# Patient Record
Sex: FEMALE | Race: WHITE | Employment: FULL TIME | URBAN - METROPOLITAN AREA
[De-identification: names, ages, dates, MRNs, and addresses within clinical notes are randomized per-mention and may not be internally consistent; named-entity substitution may affect disease eponyms.]

---

## 2017-06-12 ENCOUNTER — HOSPITAL ENCOUNTER (OUTPATIENT)
Dept: MRI IMAGING | Age: 67
Discharge: HOME | End: 2017-06-12
Payer: COMMERCIAL

## 2017-06-12 ENCOUNTER — HOSPITAL ENCOUNTER (OUTPATIENT)
Age: 67
Discharge: HOME | End: 2017-06-12
Payer: COMMERCIAL

## 2017-06-12 ENCOUNTER — HOSPITAL ENCOUNTER (OUTPATIENT)
Dept: GENERAL RADIOLOGY | Age: 67
Discharge: HOME | End: 2017-06-12
Payer: COMMERCIAL

## 2017-06-12 DIAGNOSIS — M79.672 PAIN IN LEFT FOOT: ICD-10-CM

## 2017-06-12 DIAGNOSIS — M51.36 OTHER INTERVERTEBRAL DISC DEGENERATION, LUMBAR REGION: ICD-10-CM

## 2017-06-12 PROCEDURE — 72148 MRI LUMBAR SPINE W/O DYE: CPT

## 2017-06-12 PROCEDURE — 73620 X-RAY EXAM OF FOOT: CPT

## 2018-06-15 ENCOUNTER — HOSPITAL ENCOUNTER (OUTPATIENT)
Dept: WOMENS IMAGING | Age: 68
Discharge: HOME | End: 2018-06-17
Payer: COMMERCIAL

## 2018-06-15 DIAGNOSIS — Z12.39 SCREENING BREAST EXAMINATION: ICD-10-CM

## 2018-06-15 DIAGNOSIS — Z78.0 POST-MENOPAUSAL: ICD-10-CM

## 2018-06-15 PROCEDURE — 77080 DXA BONE DENSITY AXIAL: CPT

## 2018-06-15 PROCEDURE — 77067 SCR MAMMO BI INCL CAD: CPT

## 2020-01-20 ENCOUNTER — HOSPITAL ENCOUNTER (OUTPATIENT)
Dept: GENERAL RADIOLOGY | Age: 70
Discharge: HOME | End: 2020-01-22
Payer: COMMERCIAL

## 2020-01-20 ENCOUNTER — HOSPITAL ENCOUNTER (OUTPATIENT)
Age: 70
Discharge: HOME | End: 2020-01-22
Payer: COMMERCIAL

## 2020-01-20 PROCEDURE — 72100 X-RAY EXAM L-S SPINE 2/3 VWS: CPT

## 2020-01-20 PROCEDURE — 73502 X-RAY EXAM HIP UNI 2-3 VIEWS: CPT

## 2020-08-22 PROBLEM — R00.2 PALPITATIONS: Status: ACTIVE | Noted: 2020-08-22

## 2020-08-22 PROBLEM — R06.09 DYSPNEA ON EXERTION: Status: ACTIVE | Noted: 2020-08-22

## 2020-08-22 PROBLEM — R60.0 BILATERAL LEG EDEMA: Status: ACTIVE | Noted: 2020-08-22

## 2020-11-21 ENCOUNTER — APPOINTMENT (OUTPATIENT)
Dept: CT IMAGING | Age: 70
End: 2020-11-21
Payer: COMMERCIAL

## 2020-11-21 ENCOUNTER — APPOINTMENT (OUTPATIENT)
Dept: ULTRASOUND IMAGING | Age: 70
End: 2020-11-21
Payer: COMMERCIAL

## 2020-11-21 ENCOUNTER — HOSPITAL ENCOUNTER (EMERGENCY)
Age: 70
Discharge: HOME | End: 2020-11-21
Attending: SPECIALIST
Payer: COMMERCIAL

## 2020-11-21 ENCOUNTER — APPOINTMENT (OUTPATIENT)
Dept: GENERAL RADIOLOGY | Age: 70
End: 2020-11-21
Payer: COMMERCIAL

## 2020-11-21 VITALS
OXYGEN SATURATION: 98 % | HEART RATE: 58 BPM | DIASTOLIC BLOOD PRESSURE: 80 MMHG | TEMPERATURE: 97.5 F | HEIGHT: 63 IN | BODY MASS INDEX: 31.89 KG/M2 | SYSTOLIC BLOOD PRESSURE: 106 MMHG | WEIGHT: 180 LBS | RESPIRATION RATE: 18 BRPM

## 2020-11-21 LAB
-: NORMAL
ABSOLUTE EOS #: 0.1 K/UL (ref 0–0.4)
ABSOLUTE IMMATURE GRANULOCYTE: NORMAL K/UL (ref 0–0.3)
ABSOLUTE LYMPH #: 1.6 K/UL (ref 1–4.8)
ABSOLUTE MONO #: 0.4 K/UL (ref 0.1–1.2)
ALBUMIN SERPL-MCNC: 4.3 G/DL (ref 3.5–5.2)
ALBUMIN/GLOBULIN RATIO: 1.7 (ref 1–2.5)
ALP BLD-CCNC: 57 U/L (ref 35–104)
ALT SERPL-CCNC: 30 U/L (ref 5–33)
ANION GAP SERPL CALCULATED.3IONS-SCNC: 9 MMOL/L (ref 9–17)
AST SERPL-CCNC: 24 U/L
BASOPHILS # BLD: 1 % (ref 0–2)
BASOPHILS ABSOLUTE: 0.1 K/UL (ref 0–0.2)
BILIRUB SERPL-MCNC: 0.33 MG/DL (ref 0.3–1.2)
BILIRUBIN DIRECT: 0.13 MG/DL
BILIRUBIN, INDIRECT: 0.2 MG/DL (ref 0–1)
BNP INTERPRETATION: NORMAL
BUN BLDV-MCNC: 23 MG/DL (ref 8–23)
BUN/CREAT BLD: ABNORMAL (ref 9–20)
CALCIUM SERPL-MCNC: 9.3 MG/DL (ref 8.6–10.4)
CHLORIDE BLD-SCNC: 106 MMOL/L (ref 98–107)
CO2: 25 MMOL/L (ref 20–31)
CREAT SERPL-MCNC: 0.89 MG/DL (ref 0.5–0.9)
D-DIMER QUANTITATIVE: 0.9 MG/L FEU
DIFFERENTIAL TYPE: NORMAL
EOSINOPHILS RELATIVE PERCENT: 3 % (ref 1–4)
GFR AFRICAN AMERICAN: >60 ML/MIN
GFR NON-AFRICAN AMERICAN: >60 ML/MIN
GFR SERPL CREATININE-BSD FRML MDRD: ABNORMAL ML/MIN/{1.73_M2}
GFR SERPL CREATININE-BSD FRML MDRD: ABNORMAL ML/MIN/{1.73_M2}
GLOBULIN: NORMAL G/DL (ref 1.5–3.8)
GLUCOSE BLD-MCNC: 117 MG/DL (ref 70–99)
HCT VFR BLD CALC: 38.2 % (ref 36–46)
HEMOGLOBIN: 12.7 G/DL (ref 12–16)
HOMOCYSTEINE: 12.9 UMOL/L
IMMATURE GRANULOCYTES: NORMAL %
LYMPHOCYTES # BLD: 30 % (ref 24–44)
MAGNESIUM: 2 MG/DL (ref 1.6–2.6)
MCH RBC QN AUTO: 31.3 PG (ref 26–34)
MCHC RBC AUTO-ENTMCNC: 33.3 G/DL (ref 31–37)
MCV RBC AUTO: 94 FL (ref 80–100)
MONOCYTES # BLD: 7 % (ref 2–11)
NRBC AUTOMATED: NORMAL PER 100 WBC
PDW BLD-RTO: 13 % (ref 12.5–15.4)
PLATELET # BLD: 252 K/UL (ref 140–450)
PLATELET ESTIMATE: NORMAL
PMV BLD AUTO: 9.6 FL (ref 6–12)
POTASSIUM SERPL-SCNC: 3.9 MMOL/L (ref 3.7–5.3)
PRO-BNP: 41 PG/ML
RBC # BLD: 4.07 M/UL (ref 4–5.2)
RBC # BLD: NORMAL 10*6/UL
REASON FOR REJECTION: NORMAL
SARS-COV-2, RAPID: NOT DETECTED
SARS-COV-2: NORMAL
SARS-COV-2: NORMAL
SEG NEUTROPHILS: 59 % (ref 36–66)
SEGMENTED NEUTROPHILS ABSOLUTE COUNT: 3.2 K/UL (ref 1.8–7.7)
SODIUM BLD-SCNC: 140 MMOL/L (ref 135–144)
SOURCE: NORMAL
TOTAL PROTEIN: 6.9 G/DL (ref 6.4–8.3)
TROPONIN INTERP: NORMAL
TROPONIN T: NORMAL NG/ML
TROPONIN, HIGH SENSITIVITY: 6 NG/L (ref 0–14)
WBC # BLD: 5.4 K/UL (ref 3.5–11)
WBC # BLD: NORMAL 10*3/UL
ZZ NTE CLEAN UP: ORDERED TEST: NORMAL
ZZ NTE WITH NAME CLEAN UP: SPECIMEN SOURCE: NORMAL

## 2020-11-21 PROCEDURE — 83880 ASSAY OF NATRIURETIC PEPTIDE: CPT

## 2020-11-21 PROCEDURE — 71260 CT THORAX DX C+: CPT

## 2020-11-21 PROCEDURE — 86038 ANTINUCLEAR ANTIBODIES: CPT

## 2020-11-21 PROCEDURE — 93005 ELECTROCARDIOGRAM TRACING: CPT | Performed by: EMERGENCY MEDICINE

## 2020-11-21 PROCEDURE — 86147 CARDIOLIPIN ANTIBODY EA IG: CPT

## 2020-11-21 PROCEDURE — 85300 ANTITHROMBIN III ACTIVITY: CPT

## 2020-11-21 PROCEDURE — 85613 RUSSELL VIPER VENOM DILUTED: CPT

## 2020-11-21 PROCEDURE — 80076 HEPATIC FUNCTION PANEL: CPT

## 2020-11-21 PROCEDURE — U0002 COVID-19 LAB TEST NON-CDC: HCPCS

## 2020-11-21 PROCEDURE — 71045 X-RAY EXAM CHEST 1 VIEW: CPT

## 2020-11-21 PROCEDURE — 81291 MTHFR GENE: CPT

## 2020-11-21 PROCEDURE — 84484 ASSAY OF TROPONIN QUANT: CPT

## 2020-11-21 PROCEDURE — 2580000003 HC RX 258: Performed by: EMERGENCY MEDICINE

## 2020-11-21 PROCEDURE — 96361 HYDRATE IV INFUSION ADD-ON: CPT

## 2020-11-21 PROCEDURE — 93971 EXTREMITY STUDY: CPT

## 2020-11-21 PROCEDURE — 99285 EMERGENCY DEPT VISIT HI MDM: CPT

## 2020-11-21 PROCEDURE — 83735 ASSAY OF MAGNESIUM: CPT

## 2020-11-21 PROCEDURE — 85306 CLOT INHIBIT PROT S FREE: CPT

## 2020-11-21 PROCEDURE — 85379 FIBRIN DEGRADATION QUANT: CPT

## 2020-11-21 PROCEDURE — 96360 HYDRATION IV INFUSION INIT: CPT

## 2020-11-21 PROCEDURE — 85303 CLOT INHIBIT PROT C ACTIVITY: CPT

## 2020-11-21 PROCEDURE — 80048 BASIC METABOLIC PNL TOTAL CA: CPT

## 2020-11-21 PROCEDURE — 36415 COLL VENOUS BLD VENIPUNCTURE: CPT

## 2020-11-21 PROCEDURE — 6360000004 HC RX CONTRAST MEDICATION: Performed by: EMERGENCY MEDICINE

## 2020-11-21 PROCEDURE — 83090 ASSAY OF HOMOCYSTEINE: CPT

## 2020-11-21 PROCEDURE — 85730 THROMBOPLASTIN TIME PARTIAL: CPT

## 2020-11-21 PROCEDURE — 85610 PROTHROMBIN TIME: CPT

## 2020-11-21 PROCEDURE — 85025 COMPLETE CBC W/AUTO DIFF WBC: CPT

## 2020-11-21 PROCEDURE — 81241 F5 GENE: CPT

## 2020-11-21 PROCEDURE — 81240 F2 GENE: CPT

## 2020-11-21 RX ORDER — SODIUM CHLORIDE 9 MG/ML
1000 INJECTION, SOLUTION INTRAVENOUS CONTINUOUS
Status: DISCONTINUED | OUTPATIENT
Start: 2020-11-21 | End: 2020-11-21 | Stop reason: HOSPADM

## 2020-11-21 RX ORDER — SODIUM CHLORIDE 0.9 % (FLUSH) 0.9 %
10 SYRINGE (ML) INJECTION PRN
Status: DISCONTINUED | OUTPATIENT
Start: 2020-11-21 | End: 2020-11-21

## 2020-11-21 RX ORDER — SODIUM CHLORIDE 0.9 % (FLUSH) 0.9 %
10 SYRINGE (ML) INJECTION PRN
Status: COMPLETED | OUTPATIENT
Start: 2020-11-21 | End: 2020-11-21

## 2020-11-21 RX ORDER — 0.9 % SODIUM CHLORIDE 0.9 %
80 INTRAVENOUS SOLUTION INTRAVENOUS ONCE
Status: COMPLETED | OUTPATIENT
Start: 2020-11-21 | End: 2020-11-21

## 2020-11-21 RX ADMIN — SODIUM CHLORIDE 1000 ML: 9 INJECTION, SOLUTION INTRAVENOUS at 08:21

## 2020-11-21 RX ADMIN — SODIUM CHLORIDE 80 ML: 9 INJECTION, SOLUTION INTRAVENOUS at 09:03

## 2020-11-21 RX ADMIN — IOPAMIDOL 75 ML: 755 INJECTION, SOLUTION INTRAVENOUS at 09:03

## 2020-11-21 RX ADMIN — Medication 10 ML: at 09:02

## 2020-11-21 ASSESSMENT — ENCOUNTER SYMPTOMS
COUGH: 0
SHORTNESS OF BREATH: 0

## 2020-11-21 NOTE — ED NOTES
ED physician requests to speak with pts cardiologist, dr. Jayme Love. Spoke with after hours staff who reports that Dr. Curt Sepulveda in on call and will be paged.   Will await call back        Letha Segovia RN  11/21/20 9690

## 2020-11-21 NOTE — ED NOTES
Pt ambulated to restroom, denies needs. Will continue to monitor.       Khoa Barkley RN  11/21/20 1778

## 2020-11-21 NOTE — ED NOTES
Pt and spouse are updated on plan of care, they have no requests or complaints at this time.  Call light within reach     Anjum Ca RN  11/21/20 5987

## 2020-11-21 NOTE — ED PROVIDER NOTES
by mouth daily    CLONIDINE (CATAPRES) 0.3 MG TABLET    Take 0.3 mg by mouth daily     ELASTIC BANDAGES & SUPPORTS (JOBST KNEE HIGH COMPRESSION SM) MISC    1 each by Does not apply route daily    FENOFIBRATE (TRIGLIDE) 160 MG TABLET    Take 160 mg by mouth daily    GABAPENTIN (NEURONTIN) 300 MG CAPSULE    Take 300 mg by mouth 4 times daily.  2 caps bid and 1 cap q afternoon    HYDROCHLOROTHIAZIDE (HYDRODIURIL) 25 MG TABLET    Take 1 tablet by mouth every morning May take 1 additional tablet daily PRN swelling    PANTOPRAZOLE (PROTONIX) 40 MG TABLET    Take 40 mg by mouth daily    RAMIPRIL (ALTACE) 10 MG CAPSULE    Take 1 capsule by mouth daily       ALLERGIES     Sulfa antibiotics and Flagyl [metronidazole]    FAMILY HISTORY       Family History   Problem Relation Age of Onset    Breast Cancer Mother     Heart Attack Father           SOCIAL HISTORY       Social History     Socioeconomic History    Marital status:      Spouse name: None    Number of children: None    Years of education: None    Highest education level: None   Occupational History    None   Social Needs    Financial resource strain: None    Food insecurity     Worry: None     Inability: None    Transportation needs     Medical: None     Non-medical: None   Tobacco Use    Smoking status: Never Smoker    Smokeless tobacco: Never Used   Substance and Sexual Activity    Alcohol use: No    Drug use: No    Sexual activity: Not Currently   Lifestyle    Physical activity     Days per week: None     Minutes per session: None    Stress: None   Relationships    Social connections     Talks on phone: None     Gets together: None     Attends Sikh service: None     Active member of club or organization: None     Attends meetings of clubs or organizations: None     Relationship status: None    Intimate partner violence     Fear of current or ex partner: None     Emotionally abused: None     Physically abused: None     Forced sexual activity: None   Other Topics Concern    None   Social History Narrative    None       SCREENINGS    Elaine Coma Scale  Eye Opening: Spontaneous  Best Verbal Response: Oriented  Best Motor Response: Obeys commands  Elaine Coma Scale Score: 15        PHYSICAL EXAM    (up to 7 for level 4, 8 or more for level 5)     ED Triage Vitals [11/21/20 0651]   BP Temp Temp Source Pulse Resp SpO2 Height Weight   130/82 97.5 °F (36.4 °C) Oral 70 18 100 % 5' 3\" (1.6 m) 180 lb (81.6 kg)       Physical Exam  Vitals signs reviewed. Constitutional:       General: She is not in acute distress. Appearance: She is not ill-appearing. HENT:      Head: Normocephalic. Right Ear: External ear normal.      Left Ear: External ear normal.      Nose: Nose normal.   Eyes:      Extraocular Movements: Extraocular movements intact. Neck:      Musculoskeletal: Neck supple. Cardiovascular:      Rate and Rhythm: Normal rate and regular rhythm. Pulmonary:      Effort: Pulmonary effort is normal.      Breath sounds: Normal breath sounds. Abdominal:      General: There is no distension. Palpations: Abdomen is soft. Musculoskeletal: Normal range of motion. General: No tenderness. Right lower leg: Edema present. Left lower leg: Edema present. Skin:     General: Skin is warm and dry. Coloration: Skin is not pale. Neurological:      General: No focal deficit present. Mental Status: She is alert and oriented to person, place, and time. Psychiatric:         Mood and Affect: Mood normal.         DIAGNOSTIC RESULTS     EKG: All EKG's are interpreted by the Emergency Department Physician who either signs orCo-signs this chart in the absence of a cardiologist.    Sinus rhythm with rate of 65 bpm.  First-degree AV block. Normal axis. No acute ST elevation.     RADIOLOGY:   Non-plain film images such as CT, Ultrasound and MRI are read by the radiologist. Plain radiographic images are visualized and preliminarily interpreted by the emergency physician with the below findings:    Interpretation per the Radiologist below, ifavailable at the time of this note:    US DUP LOWER EXTREMITY LEFT OTONIEL   Final Result   No evidence of DVT in the bilateral lower extremities. US DUP LOWER EXTREMITY RIGHT OTONIEL   Final Result   No evidence of DVT in the bilateral lower extremities. CT CHEST PULMONARY EMBOLISM W CONTRAST   Final Result   1. Segmental/subsegmental pulmonary emboli involving the right lower lobe. 2. Moderate size hiatal hernia. RECOMMENDATIONS:   Findings were discussed with KATIE RAPHAEL at 9:44 am on 11/21/2020. XR CHEST PORTABLE   Final Result   No acute process. ED BEDSIDE ULTRASOUND:   Performed by ED Physician - none    LABS:  Labs Reviewed   BASIC METABOLIC PANEL - Abnormal; Notable for the following components:       Result Value    Glucose 117 (*)     All other components within normal limits   LUPUS ANTICOAGULANT - Abnormal; Notable for the following components:    PTT 21.2 (*)     All other components within normal limits   HEPATIC FUNCTION PANEL   TROPONIN   BRAIN NATRIURETIC PEPTIDE   D-DIMER, QUANTITATIVE   MAGNESIUM   CBC WITH AUTO DIFFERENTIAL   COVID-19   SPECIMEN REJECTION   LUPUS ANTICOAGULANT   GEORGE SCREEN WITH REFLEX   FACTOR 5 LEIDEN   HOMOCYSTEINE, SERUM   MTHFR MUTATION   PROTHROMBIN GENE MUTATION   ANTITHROMBIN III ACTIVITY   PROTEIN C FUNCTIONAL   PROTEIN S FUNCTIONAL   PREVIOUS SPECIMEN       All other labs were within normal range ornot returned as of this dictation. EMERGENCY DEPARTMENT COURSE and DIFFERENTIAL DIAGNOSIS/MDM:   Vitals:    Vitals:    11/21/20 0715 11/21/20 0730 11/21/20 0938 11/21/20 1153   BP: 108/72 101/67 (!) 109/55 118/78   Pulse: 59 57 63 57   Resp:    16   Temp:       TempSrc:       SpO2: 98% 98% 98% 99%   Weight:       Height:           ED Course as of Nov 21 1322   Sat Nov 21, 2020   1316 Cardiac work-up is negative. Intravenous New Bag 11/21/20 0821)   iopamidol (ISOVUE-370) 76 % injection 75 mL (75 mLs Intravenous Given 11/21/20 0903)   0.9 % sodium chloride bolus (0 mLs Intravenous Stopped 11/21/20 1257)   sodium chloride flush 0.9 % injection 10 mL (10 mLs Intravenous Given 11/21/20 0902)       New Prescriptions from this visit:    New Prescriptions    RIVAROXABAN (XARELTO) 15 MG TABS TABLET    Take 1 tablet by mouth daily (with breakfast)       Follow-up:  Cheryl Ahumada, DO  [facility].  CATALINA Espinosa  [employer] [address], [address] [place] [address] Vladislav Soriano [address] [place] [address] 171 120              Final Impression:   1.  Pulmonary thromboembolism ([address]               (Please note that portions of this note were completed with a voice recognitionprogram.  Efforts were made to edit the dictations but occasionally words are mis-transcribed.)    Shawn Lo MD (electronically signed)  Attending Emergency Physician            Shawn Lo MD  11/21/20 [phone]

## 2020-11-21 NOTE — ED NOTES
Luana Sterling MD at bedside. Results discussed with pt et spouse.         Jorge Morris RN  11/21/20 0201

## 2020-11-23 ENCOUNTER — HOSPITAL ENCOUNTER (EMERGENCY)
Age: 70
Discharge: HOME | End: 2020-11-23
Attending: EMERGENCY MEDICINE
Payer: COMMERCIAL

## 2020-11-23 VITALS
BODY MASS INDEX: 31.89 KG/M2 | TEMPERATURE: 98.6 F | OXYGEN SATURATION: 98 % | RESPIRATION RATE: 14 BRPM | HEIGHT: 63 IN | HEART RATE: 86 BPM | SYSTOLIC BLOOD PRESSURE: 149 MMHG | WEIGHT: 180 LBS | DIASTOLIC BLOOD PRESSURE: 87 MMHG

## 2020-11-23 LAB
ABSOLUTE EOS #: 0.1 K/UL (ref 0–0.4)
ABSOLUTE IMMATURE GRANULOCYTE: ABNORMAL K/UL (ref 0–0.3)
ABSOLUTE LYMPH #: 1 K/UL (ref 1–4.8)
ABSOLUTE MONO #: 0.3 K/UL (ref 0.1–1.2)
ANION GAP SERPL CALCULATED.3IONS-SCNC: 10 MMOL/L (ref 9–17)
ANTI-NUCLEAR ANTIBODY (ANA): NEGATIVE
AT-III ACTIVITY: 77 % (ref 83–122)
BASOPHILS # BLD: 1 % (ref 0–2)
BASOPHILS ABSOLUTE: 0 K/UL (ref 0–0.2)
BUN BLDV-MCNC: 17 MG/DL (ref 8–23)
BUN/CREAT BLD: ABNORMAL (ref 9–20)
CALCIUM SERPL-MCNC: 9.5 MG/DL (ref 8.6–10.4)
CHLORIDE BLD-SCNC: 102 MMOL/L (ref 98–107)
CO2: 24 MMOL/L (ref 20–31)
CREAT SERPL-MCNC: 0.87 MG/DL (ref 0.5–0.9)
DIFFERENTIAL TYPE: ABNORMAL
EKG ATRIAL RATE: 65 BPM
EKG P AXIS: 14 DEGREES
EKG P-R INTERVAL: 232 MS
EKG Q-T INTERVAL: 398 MS
EKG QRS DURATION: 82 MS
EKG QTC CALCULATION (BAZETT): 413 MS
EKG R AXIS: -2 DEGREES
EKG T AXIS: 21 DEGREES
EKG VENTRICULAR RATE: 65 BPM
EOSINOPHILS RELATIVE PERCENT: 2 % (ref 1–4)
GFR AFRICAN AMERICAN: >60 ML/MIN
GFR NON-AFRICAN AMERICAN: >60 ML/MIN
GFR SERPL CREATININE-BSD FRML MDRD: ABNORMAL ML/MIN/{1.73_M2}
GFR SERPL CREATININE-BSD FRML MDRD: ABNORMAL ML/MIN/{1.73_M2}
GLUCOSE BLD-MCNC: 105 MG/DL (ref 70–99)
HCT VFR BLD CALC: 38.6 % (ref 36–46)
HEMOGLOBIN: 13.1 G/DL (ref 12–16)
IMMATURE GRANULOCYTES: ABNORMAL %
INR BLD: 1.3
LYMPHOCYTES # BLD: 18 % (ref 24–44)
MCH RBC QN AUTO: 31.5 PG (ref 26–34)
MCHC RBC AUTO-ENTMCNC: 34 G/DL (ref 31–37)
MCV RBC AUTO: 92.7 FL (ref 80–100)
MONOCYTES # BLD: 6 % (ref 2–11)
NRBC AUTOMATED: ABNORMAL PER 100 WBC
PARTIAL THROMBOPLASTIN TIME: 38 SEC (ref 21.3–31.3)
PDW BLD-RTO: 12.8 % (ref 12.5–15.4)
PLATELET # BLD: 274 K/UL (ref 140–450)
PLATELET ESTIMATE: ABNORMAL
PMV BLD AUTO: 8.7 FL (ref 6–12)
POTASSIUM SERPL-SCNC: 3.5 MMOL/L (ref 3.7–5.3)
PROTEIN C ACTIVITY: 122 %
PROTEIN S ACTIVITY: 82 % (ref 59–130)
PROTHROMBIN TIME: 13.1 SEC (ref 9.4–12.6)
RBC # BLD: 4.16 M/UL (ref 4–5.2)
RBC # BLD: ABNORMAL 10*6/UL
SEG NEUTROPHILS: 73 % (ref 36–66)
SEGMENTED NEUTROPHILS ABSOLUTE COUNT: 4.3 K/UL (ref 1.8–7.7)
SODIUM BLD-SCNC: 136 MMOL/L (ref 135–144)
WBC # BLD: 5.8 K/UL (ref 3.5–11)
WBC # BLD: ABNORMAL 10*3/UL

## 2020-11-23 PROCEDURE — 99284 EMERGENCY DEPT VISIT MOD MDM: CPT

## 2020-11-23 PROCEDURE — 85025 COMPLETE CBC W/AUTO DIFF WBC: CPT

## 2020-11-23 PROCEDURE — 85610 PROTHROMBIN TIME: CPT

## 2020-11-23 PROCEDURE — 80048 BASIC METABOLIC PNL TOTAL CA: CPT

## 2020-11-23 PROCEDURE — 85730 THROMBOPLASTIN TIME PARTIAL: CPT

## 2020-11-23 PROCEDURE — 36415 COLL VENOUS BLD VENIPUNCTURE: CPT

## 2020-11-23 ASSESSMENT — PAIN DESCRIPTION - PAIN TYPE: TYPE: ACUTE PAIN

## 2020-11-23 ASSESSMENT — ENCOUNTER SYMPTOMS: SHORTNESS OF BREATH: 0

## 2020-11-23 ASSESSMENT — PAIN DESCRIPTION - LOCATION: LOCATION: GROIN

## 2020-11-23 ASSESSMENT — PAIN SCALES - GENERAL: PAINLEVEL_OUTOF10: 4

## 2020-11-23 NOTE — ED PROVIDER NOTES
Take 1 tablet by mouth every morning May take 1 additional tablet daily PRN swelling    PANTOPRAZOLE (PROTONIX) 40 MG TABLET    Take 40 mg by mouth daily    RAMIPRIL (ALTACE) 10 MG CAPSULE    Take 1 capsule by mouth daily       ALLERGIES     is allergic to sulfa antibiotics and flagyl [metronidazole]. FAMILY HISTORY     She indicated that the status of her mother is unknown. She indicated that the status of her father is unknown.     family history includes Breast Cancer in her mother; Heart Attack in her father. SOCIAL HISTORY      reports that she has never smoked. She has never used smokeless tobacco. She reports that she does not drink alcohol or use drugs. PHYSICAL EXAM    (up to 7 for level 4, 8 or more for level 5)   INITIAL VITALS:  height is 5' 3\" (1.6 m) and weight is 81.6 kg (180 lb). Her oral temperature is 98.6 °F (37 °C). Her blood pressure is 149/87 (abnormal) and her pulse is 86. Her respiration is 14 and oxygen saturation is 98%. Physical Exam  Vitals signs and nursing note reviewed. Constitutional:       Appearance: Normal appearance. HENT:      Head: Normocephalic and atraumatic. Eyes:      Conjunctiva/sclera: Conjunctivae normal.   Neck:      Musculoskeletal: Normal range of motion and neck supple. No neck rigidity or muscular tenderness. Cardiovascular:      Rate and Rhythm: Normal rate and regular rhythm. Pulmonary:      Effort: Pulmonary effort is normal.      Breath sounds: Normal breath sounds. Abdominal:      General: Bowel sounds are normal. There is no distension. Palpations: Abdomen is soft. There is no mass. Tenderness: There is no abdominal tenderness. There is no guarding or rebound. Musculoskeletal: Normal range of motion. General: No swelling or tenderness. Lymphadenopathy:      Cervical: No cervical adenopathy.    Skin:     Comments: Erythematous rash to the groin region with some small erythematous petechiae scattered throughout a confluent area of erythema that is in the groin no hives appreciated   Neurological:      General: No focal deficit present. Mental Status: She is alert.          DIFFERENTIAL DIAGNOSIS/ MDM:     I will check basic labs    DIAGNOSTIC RESULTS         LABS:  Results for orders placed or performed during the hospital encounter of 11/23/20   CBC Auto Differential   Result Value Ref Range    WBC 5.8 3.5 - 11.0 k/uL    RBC 4.16 4.0 - 5.2 m/uL    Hemoglobin 13.1 12.0 - 16.0 g/dL    Hematocrit 38.6 36 - 46 %    MCV 92.7 80 - 100 fL    MCH 31.5 26 - 34 pg    MCHC 34.0 31 - 37 g/dL    RDW 12.8 12.5 - 15.4 %    Platelets 560 547 - 803 k/uL    MPV 8.7 6.0 - 12.0 fL    NRBC Automated NOT REPORTED per 100 WBC    Differential Type NOT REPORTED     Seg Neutrophils 73 (H) 36 - 66 %    Lymphocytes 18 (L) 24 - 44 %    Monocytes 6 2 - 11 %    Eosinophils % 2 1 - 4 %    Basophils 1 0 - 2 %    Immature Granulocytes NOT REPORTED 0 %    Segs Absolute 4.30 1.8 - 7.7 k/uL    Absolute Lymph # 1.00 1.0 - 4.8 k/uL    Absolute Mono # 0.30 0.1 - 1.2 k/uL    Absolute Eos # 0.10 0.0 - 0.4 k/uL    Basophils Absolute 0.00 0.0 - 0.2 k/uL    Absolute Immature Granulocyte NOT REPORTED 0.00 - 0.30 k/uL    WBC Morphology NOT REPORTED     RBC Morphology NOT REPORTED     Platelet Estimate NOT REPORTED    Basic Metabolic Panel   Result Value Ref Range    Glucose 105 (H) 70 - 99 mg/dL    BUN 17 8 - 23 mg/dL    CREATININE 0.87 0.50 - 0.90 mg/dL    Bun/Cre Ratio NOT REPORTED 9 - 20    Calcium 9.5 8.6 - 10.4 mg/dL    Sodium 136 135 - 144 mmol/L    Potassium 3.5 (L) 3.7 - 5.3 mmol/L    Chloride 102 98 - 107 mmol/L    CO2 24 20 - 31 mmol/L    Anion Gap 10 9 - 17 mmol/L    GFR Non-African American >60 >60 mL/min    GFR African American >60 >60 mL/min    GFR Comment          GFR Staging NOT REPORTED    Protime-INR   Result Value Ref Range    Protime 13.1 (H) 9.4 - 12.6 sec    INR 1.3    APTT   Result Value Ref Range    PTT 38.0 (H) 21.3 - 31.3 sec EMERGENCY DEPARTMENT COURSE:   Vitals:    Vitals:    11/23/20 1038   BP: (!) 149/87   Pulse: 86   Resp: 14   Temp: 98.6 °F (37 °C)   TempSrc: Oral   SpO2: 98%   Weight: 81.6 kg (180 lb)   Height: 5' 3\" (1.6 m)     -------------------------  BP: (!) 149/87, Temp: 98.6 °F (37 °C), Pulse: 86, Resp: 14      RE-EVALUATION:  Patient presents with a dermatitis in the groin region I cannot completely rule out that this is not a reaction to the Xarelto she was started on I did discuss the patient with her family doctor she is requesting that we have her stop the Xarelto and switch her over to Eliquis and the family doctor will see her in the office    CONSULTS:  The patient's family doctor is requesting that we stop the Xarelto switch the patient over to Eliquis and she will then see the patient in her office    PROCEDURES:  None    FINAL IMPRESSION      1. Rash and other nonspecific skin eruption          DISPOSITION/PLAN   DISPOSITION        CONDITION ON DISPOSITION:   Stable    PATIENT REFERRED TO:  Kirsten Ahumada, [address]. [address] [place] [address]  [phone]    Call in 2 days        DISCHARGE MEDICATIONS:  New Prescriptions    APIXABAN (ELIQUIS) 5 MG TABS TABLET    Take 1 tablet by mouth 2 times daily       (Please note that portions of this note were completed with a voicerecognition program.  Efforts were made to edit the dictations but occasionally words are mis-transcribed.)    Beal MD, F.A.C.E.P.   Attending Emergency Medicine Physician       Rachid Sanchez MD  11/23/20 7296

## 2020-11-24 LAB
ANTICARDIOLIPIN IGA ANTIBODY: 2.3 APU
ANTICARDIOLIPIN IGG ANTIBODY: 3.2 GPU
CARDIOLIPIN AB IGM: 15.4 MPU
DILUTE RUSSELL VIPER VENOM TIME: ABNORMAL
INR BLD: 1
LUPUS ANTICOAG: ABNORMAL
PARTIAL THROMBOPLASTIN TIME: 21.2 SEC (ref 21.3–31.3)
PROTHROMBIN TIME: 10.2 SEC (ref 9.4–12.6)

## 2020-11-27 LAB
FACTOR V MUTATION: NEGATIVE
MTHFR INTERPRETATION: NORMAL
MTHFR MUTATION A1286C: NORMAL
MTHFR MUTATION C665T: NORMAL
SPECIMEN: NORMAL
SPECIMEN: NORMAL

## 2020-11-30 LAB
ANTICARDIOLIPIN IGA ANTIBODY: NORMAL APU
ANTICARDIOLIPIN IGG ANTIBODY: NORMAL GPU
CARDIOLIPIN AB IGM: NORMAL MPU
DILUTE RUSSELL VIPER VENOM TIME: NORMAL
INR BLD: NORMAL
LUPUS ANTICOAG: NORMAL
PARTIAL THROMBOPLASTIN TIME: NORMAL SEC
PROTHROMBIN G20210A MUTATION: NEGATIVE
PROTHROMBIN TIME: NORMAL SEC
PT PCR SPECIMEN: NORMAL

## 2021-02-24 ENCOUNTER — HOSPITAL ENCOUNTER (OUTPATIENT)
Age: 71
Discharge: HOME | End: 2021-02-26
Payer: COMMERCIAL

## 2021-02-24 ENCOUNTER — HOSPITAL ENCOUNTER (OUTPATIENT)
Dept: GENERAL RADIOLOGY | Age: 71
Discharge: HOME | End: 2021-02-26
Payer: COMMERCIAL

## 2021-02-24 DIAGNOSIS — M25.551 RIGHT HIP PAIN: ICD-10-CM

## 2021-02-24 DIAGNOSIS — M51.36 OTHER INTERVERTEBRAL DISC DEGENERATION, LUMBAR REGION: ICD-10-CM

## 2021-02-24 PROCEDURE — 72100 X-RAY EXAM L-S SPINE 2/3 VWS: CPT

## 2021-02-24 PROCEDURE — 73502 X-RAY EXAM HIP UNI 2-3 VIEWS: CPT

## 2021-04-10 ENCOUNTER — HOSPITAL ENCOUNTER (OUTPATIENT)
Age: 71
Discharge: HOME | End: 2021-04-10
Payer: COMMERCIAL

## 2021-04-10 PROCEDURE — 36415 COLL VENOUS BLD VENIPUNCTURE: CPT

## 2021-04-10 PROCEDURE — 85300 ANTITHROMBIN III ACTIVITY: CPT

## 2021-04-10 PROCEDURE — 85303 CLOT INHIBIT PROT C ACTIVITY: CPT

## 2021-04-10 PROCEDURE — 85306 CLOT INHIBIT PROT S FREE: CPT

## 2021-04-10 PROCEDURE — 86147 CARDIOLIPIN ANTIBODY EA IG: CPT

## 2021-04-13 LAB
ANTICARDIOLIPIN IGA ANTIBODY: 6.7 APL (ref 0–14)
ANTICARDIOLIPIN IGG ANTIBODY: 1.8 GPL (ref 0–10)
AT-III ACTIVITY: 96 % (ref 83–122)
CARDIOLIPIN AB IGM: 1.9 MPL (ref 0–10)
PROTEIN C ACTIVITY: >149 %
PROTEIN S ACTIVITY: 130 % (ref 59–130)

## 2023-06-21 ENCOUNTER — HOSPITAL ENCOUNTER (OUTPATIENT)
Age: 73
Discharge: HOME | End: 2023-06-23
Payer: MEDICARE

## 2023-06-21 ENCOUNTER — HOSPITAL ENCOUNTER (OUTPATIENT)
Dept: GENERAL RADIOLOGY | Age: 73
Discharge: HOME | End: 2023-06-23
Payer: MEDICARE

## 2023-06-21 DIAGNOSIS — M79.642 PAIN OF LEFT HAND: ICD-10-CM

## 2023-06-21 DIAGNOSIS — M54.2 CERVICALGIA: ICD-10-CM

## 2023-06-21 DIAGNOSIS — M48.062 LUMBAR STENOSIS WITH NEUROGENIC CLAUDICATION: ICD-10-CM

## 2023-06-21 DIAGNOSIS — M79.644 PAIN IN RIGHT FINGER(S): ICD-10-CM

## 2023-06-21 PROCEDURE — 72100 X-RAY EXAM L-S SPINE 2/3 VWS: CPT

## 2023-06-21 PROCEDURE — 73140 X-RAY EXAM OF FINGER(S): CPT

## 2023-06-21 PROCEDURE — 73120 X-RAY EXAM OF HAND: CPT

## 2023-06-21 PROCEDURE — 72040 X-RAY EXAM NECK SPINE 2-3 VW: CPT

## 2023-08-31 ENCOUNTER — HOSPITAL ENCOUNTER (OUTPATIENT)
Dept: PHYSICAL THERAPY | Facility: CLINIC | Age: 73
Setting detail: THERAPIES SERIES
Discharge: HOME | End: 2023-08-31
Payer: MEDICARE

## 2023-08-31 PROCEDURE — 97161 PT EVAL LOW COMPLEX 20 MIN: CPT

## 2023-08-31 PROCEDURE — 97110 THERAPEUTIC EXERCISES: CPT

## 2023-08-31 NOTE — CONSULTS
[address]  Phone: [phone]  Fax: [phone]       Physical Therapy Spine Evaluation    Date:  2023  Patient: Benjamin Cruz  : 1950  MRN: [id number]  Physician: Stephanie Gillette DO   Insurance: [employer] ([employer]  Medical Diagnosis: M47.816 (ICD-10-CM) - Spondylosis without myelopathy or radiculopathy, lumbar region  M51.36 (ICD-10-CM) - Other intervertebral disc degeneration, lumbar region  Rehab Codes: M54.5  Onset Date: 23  Next 's appt. : --      Subjective:   CC/HPI: Pt reports to PT with low back pain. Per pt, she reports that she has been having pain in her R LB that has gotten worse over the last 4 months while helping her elderly neighbor. States that her pain limits her ability to walk, as well as occasionally have pain with standing for extended periods or going to sit down. Pt notes that she has taken prednisone which helped and is now taking a muscle relaxer however it's too early to tell if it is helping. Pt denies any numbness/tingling down her LEs. PMHx:   [] Unremarkable               [x] Refer to full medical chart  In [facility]         Radiology: Date Performed: Results:     [x] X-RAY 23 FINDINGS:  Levoscoliosis is noted. Vertebral bodies are preserved in height. No  fracture or bone erosion identified. There is severe degenerative disc space  narrowing at L1-2 and L4-5, mild disc space narrowing at L2-3 and moderate  disc space narrowing at L3-4. Endplate spurring is noted at all levels from  degenerative spondylosis. IMPRESSION:  Levoscoliosis with multilevel degenerative change.    [] MRI     [] Other            Comorbidities:   [] Obesity [] Dialysis  [] N/A   [] Asthma/COPD [] Dementia [x] Other: Cancer   [] Stroke [] Sleep apnea [x] Other: Lung disease   [] Vascular disease [] Rheumatic disease [] Other:       Fall Risk:      [x] None   [] PRESENT/See Whitaker Scale

## 2023-09-06 ENCOUNTER — HOSPITAL ENCOUNTER (OUTPATIENT)
Dept: PHYSICAL THERAPY | Facility: CLINIC | Age: 73
Setting detail: THERAPIES SERIES
Discharge: HOME | End: 2023-09-06
Payer: MEDICARE

## 2023-09-06 PROCEDURE — 97110 THERAPEUTIC EXERCISES: CPT

## 2023-09-06 NOTE — FLOWSHEET NOTE
[] [address]  [address] [facility].  [phone]  F: [phone] [] [address] [facility]  [address]   [address]  P: [phone]  F: [phone] [x] [address]  [facility]  P: [phone]  F: [phone] [] [facility]: [phone]  F: [phone] [] [address] [facility]  [address] [facility]   [address]   P: [phone]  F: [phone]  [] [address] [facility].   P: [phone]  F: [phone] [] [facility]  [address]  P: [phone]  F: [phone] [] [address] [facility]  [address] [facility]  [place]: [phone]  F: [phone] [] [address] [facility] [address]  [place]: [phone]  F: [phone]      Physical Therapy Daily Treatment Note    Date:  2023  Patient Name:  Diego Velázquez    :  1950  MRN: [id number]  Physician: Chastity Lobato DO                            Insurance: [employer] (Med. [facility]  Medical Diagnosis: M47.816 (ICD-10-CM) - Spondylosis without myelopathy or radiculopathy, lumbar region  M51.36 (ICD-10-CM) - Other intervertebral disc degeneration, lumbar region  Rehab Codes: M54.5  Onset Date: 23               Next 's appt. : --    Visit# / total visits: 2/ (2x wk)      Progress note for Medicare patient due at visit 10     Cancels/No Shows: 0/0    Subjective:    Pain:  [x] Yes  [] No Location: LB    Pain Rating: (0-10 scale) --/10  Pain altered Tx:  [x] No  [] Yes  Action:  Comments: Pt reports very

## 2023-09-11 ENCOUNTER — HOSPITAL ENCOUNTER (OUTPATIENT)
Dept: PHYSICAL THERAPY | Facility: CLINIC | Age: 73
Setting detail: THERAPIES SERIES
Discharge: HOME | End: 2023-09-11
Payer: MEDICARE

## 2023-09-11 PROCEDURE — 97110 THERAPEUTIC EXERCISES: CPT

## 2023-09-13 ENCOUNTER — HOSPITAL ENCOUNTER (OUTPATIENT)
Dept: PHYSICAL THERAPY | Facility: CLINIC | Age: 73
Setting detail: THERAPIES SERIES
Discharge: HOME | End: 2023-09-13
Payer: MEDICARE

## 2023-09-13 PROCEDURE — 97110 THERAPEUTIC EXERCISES: CPT

## 2023-09-13 NOTE — FLOWSHEET NOTE
Verbalizes understanding. [x] Demonstrates understanding. [x] Needs review. [] Demonstrates/verbalizes HEP/Ed previously given. Plan: [x] Continue current frequency toward long and short term goals. [x] Specific Instructions for subsequent treatments: Continue tx per POC      Time In: 1000          Time Out: 1100    Electronically signed by:   Tyra Nunez PT

## 2023-09-18 ENCOUNTER — HOSPITAL ENCOUNTER (OUTPATIENT)
Dept: PHYSICAL THERAPY | Facility: CLINIC | Age: 73
Setting detail: THERAPIES SERIES
Discharge: HOME | End: 2023-09-18
Payer: MEDICARE

## 2023-09-18 PROCEDURE — 97110 THERAPEUTIC EXERCISES: CPT

## 2023-09-18 NOTE — FLOWSHEET NOTE
[x] [facility]  Outpatient Rehabilitation &  Therapy  [address] [facility]  P: [phone]  F: [phone]     Physical Therapy Daily Treatment Note    Date:  2023  Patient Name:  Kel Blackburn    :  1950  MRN: [id number]  Physician: Codie Garay DO                            Insurance: [employer] ([address] 5 visits)  Medical Diagnosis: M47.816 (ICD-10-CM) - Spondylosis without myelopathy or radiculopathy, lumbar region  M51.36 (ICD-10-CM) - Other intervertebral disc degeneration, lumbar region  Rehab Codes: M54.5  Onset Date: 23               Next 's appt. : --  Visit# / total visits:  ()     Progress note for Medicare patient due at visit 10     Cancels/No Shows: 0/0    Subjective:  Pt denies any pain upon arrival and notes she did not go to the pool today like usual. Some discomfort with SL ex at home due to being on the floor. Pain:  [] Yes  [x] No  Location: LB    Pain Rating: (0-10 scale) --/10  Pain altered Tx:  [x] No  [] Yes  Action:  Comments:     Objective:  Modalities:   Precautions: Standard   Exercise  Low Back Pain Reps/ Time Weight/ Level Comments    Nustep  10'  L1   x              HS step S 3x30\"     x   SB S 3x30\"    3rd slot x   Hip flexor step S 3x30\"      x   SKTC S 3x30\"     x   Figure 4 S 3x30\"     x   LTR 10x10\"     x                         Bridges 2x10, 3\"  Orange   x   Clamshells 3x10  [place]    x   SL Hip abd 3x10    harder on R  x               TG Squats  2x10 L16    x                         Other:     Specific Instructions for next treatment: Continue tx per POC      Treatment Charges: Mins Units   []  Modalities     [x]  Ther Exercise 47 3   []  Manual Therapy     []  Ther Activities     []  Neuro Re-ed     []  Vasocompression     [] Gait     []  Other     Total Treatment time 47 3       Assessment: [x] Progressing toward goals. Warmup completed followed by stretches.  Min recall required as pt generally has to refer to her

## 2023-09-20 ENCOUNTER — HOSPITAL ENCOUNTER (OUTPATIENT)
Dept: PHYSICAL THERAPY | Facility: CLINIC | Age: 73
Setting detail: THERAPIES SERIES
Discharge: HOME | End: 2023-09-20
Payer: MEDICARE

## 2023-09-20 NOTE — FLOWSHEET NOTE
[] [facility]) - [facility] &  Therapy  [address] [facility].    [phone]  F: [phone]   [] [facility]  [address]   [address]  P: [phone]  F: [phone]  [] [address] &  Therapy  [address] [facility]  P: [phone]  F: [phone] [] Tarik Irene  P: [phone]  F: [phone]  [] [address] [facility]  [address] [facility] [address] [facility]   [address]   [place]: [phone]  F: [phone]   [] [facility]  [address] [facility] [address]  [place]: [phone]   F: [phone]     Physical Therapy Cancel/No Show note    Date: 2023  Patient: Jamshid Avalos  : 1950  MRN: [id number]    Cancels/No Shows to date:     For today's appointment patient:    [x]  Cancelled    [] Rescheduled appointment    [] No-show     Reason given by patient:    [x]  Patient ill    []  Conflicting appointment    [] No transportation      [] Conflict with work    [] No reason given    [] Weather related    [] COVID-19    [] Other:      Comments:        [] Next appointment was confirmed    Electronically signed by: Leanne Azar

## 2023-09-27 ENCOUNTER — HOSPITAL ENCOUNTER (OUTPATIENT)
Dept: PHYSICAL THERAPY | Facility: CLINIC | Age: 73
Setting detail: THERAPIES SERIES
Discharge: HOME | End: 2023-09-27
Payer: MEDICARE

## 2023-09-27 PROCEDURE — 97110 THERAPEUTIC EXERCISES: CPT

## 2023-09-27 NOTE — FLOWSHEET NOTE
Reduce pain levels to 1/10 or less with activity to help ease walking recreationally. []  []  []      3. ? Strength: Increase jackie hip/core strength to 4+/5 grossly to help improve LE stability with all walking and help reduce pain with ambulation. []  []  []                        Patient goals: \"I want to be able to walk without pain daily\"      Pt. Education:  [x] Yes  [] No  [x] Reviewed Prior HEP/Ed  Method of Education: [x] Verbal  [x] Demo  [] Written  Comprehension of Education:  [x] Verbalizes understanding. [x] Demonstrates understanding. [x] Needs review. [] Demonstrates/verbalizes HEP/Ed previously given. Access Code: [email or web address]  URL: [email or web address]  Date: 09/27/2023  Prepared by: [employer]    Exercises  - Standing Heel Raises  - 2 x daily - 7 x weekly - 1-2 sets - 10 reps  - Standing March with Counter Support  - 2 x daily - 7 x weekly - 1-2 sets - 10 reps  - Standing Hip Flexion with Resistance at Ankles and Unilateral Counter Support  - 2 x daily - 7 x weekly - 1-2 sets - 10 reps  - Hip Extension with Resistance Loop  - 2 x daily - 7 x weekly - 1-2 sets - 10 reps  - Hip Abduction with Resistance Loop  - 2 x daily - 7 x weekly - 1-2 sets - 10 reps        Plan: [x] Continue current frequency toward long and short term goals. [x] Specific Instructions for subsequent treatments: Pt on hold d/t insurance auth requirements. Time In: 11 AM          Time Out: 11:57 AM    Electronically signed by:   Lilly Leong PTA

## 2023-10-11 ENCOUNTER — HOSPITAL ENCOUNTER (OUTPATIENT)
Dept: PHYSICAL THERAPY | Facility: CLINIC | Age: 73
Setting detail: THERAPIES SERIES
Discharge: HOME | End: 2023-10-11
Payer: MEDICARE

## 2023-10-11 NOTE — FLOWSHEET NOTE
[] [facility]) - [facility] &  Therapy  [address] [facility].    [phone]  F: [phone]   [] [address] [facility]  [address]   [address]  P: [phone]  F: [phone]  [] [address] &  Therapy  [address] [facility]  P: [phone]  F: [phone] [] [facility]  P: [phone]  F: [phone]  [] [address] [facility]   [address]   [place]: [phone]  F: [phone]   [] [address] [facility]  [address] [facility] [address]  [place]: [phone]   F: [phone]     Physical Therapy Cancel/No Show note    Date: 10/11/2023  Patient: Marilia Chi  : 1950  MRN: [id number]    Cancels/No Shows to date: 0    For today's appointment patient:    [x]  Cancelled    [] Rescheduled appointment    [] No-show     Reason given by patient:    [x]  Patient ill    []  Conflicting appointment    [] No transportation      [] Conflict with work    [] No reason given    [] Weather related    [] [id number]    [] Other:      Comments:        [] Next appointment was confirmed    Electronically signed by: Duncan Luna

## 2023-10-12 ENCOUNTER — HOSPITAL ENCOUNTER (OUTPATIENT)
Dept: PHYSICAL THERAPY | Facility: CLINIC | Age: 73
Setting detail: THERAPIES SERIES
Discharge: HOME | End: 2023-10-12
Payer: MEDICARE

## 2023-10-12 PROCEDURE — 97110 THERAPEUTIC EXERCISES: CPT

## 2023-10-12 NOTE — FLOWSHEET NOTE
[x] [facility]  Outpatient Rehabilitation &  Therapy  [address]  P: [phone]  F: [phone]     Physical Therapy Daily Treatment Note    Date:  10/12/2023  Patient Name:  Froy Saleh    :  1950  MRN: [id number]  Physician: Jani Quiroz DO                            Insurance: [employer] **[id number] Double R Leticia approved 5 add'l PT vs for 10/11/23 - 12/10/23 Auth #[id number] (same cpt codes)   CPT Codes: 71638, 23126, 31583, 66361, 66041 2X/wk for 8wks (5visits)  Medical Diagnosis: M47.816 (ICD-10-CM) - Spondylosis without myelopathy or radiculopathy, lumbar region  M51.36 (ICD-10-CM) - Other intervertebral disc degeneration, lumbar region  Rehab Codes: M54.5  Onset Date: 23               Next 's appt. : --  Visit# / total visits:  ()    Progress note for Medicare patient due at visit 10     Cancels/No Shows: 0/0    Subjective:  Pt     Pain:  [] Yes  [x] No  Location: [facility]    Pain Rating: (0-10 scale) 0/10  Pain altered Tx:  [x] No  [] Yes  Action:  Comments: Pt arrived after hiatus d/t insurance issues. Cont to note improvement with transfers, min pain with getting into a chair and up off the floor. Has stayed consistent to HEP and pool aerobics.      Objective:  Modalities:   Precautions: Standard   Exercise  Low Back Pain Reps/ Time Weight/ Level Comments    Nustep  10'  L3   x              HS step S 3x30\"     x   SB S 3x30\"    3rd slot x   Hip flexor step S 3x30\"      x   SKTC S 3x30\"     x   Figure 4 S 3x30\"        LTR 10x10\"                             Bridges 2x10, 3\"  Lime   x   Clamshells 2x10  Lime   x   SL Hip abd 2x10  Lime  harder on R  x   TA Sets 10x5\"   x   TA + Marches   Added to HEP                      TG Squats  2x10 L18   x              HR, Marches, 3 Way Hip  2x10  Lime  x   Other:     Specific Instructions for next treatment: Continue tx per POC      Treatment Charges: Mins Units   []  Modalities     [x]  Ther Exercise 50 3   []  Manual

## 2023-10-18 ENCOUNTER — HOSPITAL ENCOUNTER (OUTPATIENT)
Dept: PHYSICAL THERAPY | Facility: CLINIC | Age: 73
Setting detail: THERAPIES SERIES
Discharge: HOME | End: 2023-10-18
Payer: MEDICARE

## 2023-10-18 PROCEDURE — 97110 THERAPEUTIC EXERCISES: CPT

## 2023-10-18 NOTE — FLOWSHEET NOTE
[x] [facility]  Outpatient Rehabilitation &  Therapy  [address]  P: [phone]  F: [phone]     Physical Therapy Daily Treatment Note    Date:  10/18/2023  Patient Name:  Kevin Mai    :  1950  MRN: [id number]  Physician: Mendel Ide, DO                            Insurance: Bryce Ear **[id number] Double R Ewen approved 5 add'l PT vs for 10/11/23 - 12/10/23 Auth #[id number] (same cpt codes)   CPT Codes: 06716, 50832, 57711, 70263, 07653 2X/wk for 8wks (5visits)  Medical Diagnosis: M47.816 (ICD-10-CM) - Spondylosis without myelopathy or radiculopathy, lumbar region  M51.36 (ICD-10-CM) - Other intervertebral disc degeneration, lumbar region  Rehab Codes: M54.5  Onset Date: 23               Next 's appt. : --  Visit# / total visits:  (2/5)    Progress note for Medicare patient due at visit 10     Cancels/No Shows: 0/0    Subjective:      Pain:  [] Yes  [x] No  Location: LB    Pain Rating: (0-10 scale) 0/10  Pain altered Tx:  [x] No  [] Yes  Action:  Comments: Pt arrives with no new complaints. Exercises have been going well but feels very sore from completing her pool workouts and HEP 2-3x a day. Notices improvement with strength, getting up in the morning and with dressing tasks.      Objective:  Modalities:   Precautions: Standard   Exercise  Low Back Pain Reps/ Time Weight/ Level Comments    Nustep  10'  L3   x              HS step S 3x30\"     x   SB S 3x30\"    3rd slot x   Hip flexor step S 3x30\"      x   SKTC S 3x30\"        Figure 4 S 3x30\"        LTR 10x10\"                             Bridges 2x10, 3\"  Lime   x   Clamshells 2x10  Lime   x   SL Hip abd 2x10  Lime  x   TA Sets 15x5\"   x   TA + Marches 2x10   x                     TG Squats  2x15  L20   x   TB Sidestepping  3L  Lime   x   HR, Marches, 3 Way Hip  2x10  Lime  x   Other:     Specific Instructions for next treatment: Continue tx per POC      Treatment Charges: Mins Units   []  Modalities     [x]

## 2023-10-25 ENCOUNTER — HOSPITAL ENCOUNTER (OUTPATIENT)
Dept: PHYSICAL THERAPY | Facility: CLINIC | Age: 73
Setting detail: THERAPIES SERIES
Discharge: HOME | End: 2023-10-25
Payer: MEDICARE

## 2023-10-25 PROCEDURE — 97110 THERAPEUTIC EXERCISES: CPT

## 2023-10-25 NOTE — FLOWSHEET NOTE
recreationally. []  []  []      3. ? Strength: Increase jackie hip/core strength to 4+/5 grossly to help improve LE stability with all walking and help reduce pain with ambulation. []  []  []                        Patient goals: \"I want to be able to walk without pain daily\"      Pt. Education:  [x] Yes  [] No  [x] Reviewed Prior HEP/Ed  Method of Education: [x] Verbal  [x] Demo  [] Written  Comprehension of Education:  [x] Verbalizes understanding. [x] Demonstrates understanding. [x] Needs review. [] Demonstrates/verbalizes HEP/Ed previously given. Access Code: [email or web address]  URL: [employer]/  Date: 10/25/2023  Prepared by:  [employer]    Exercises  - Supine Single Knee to Chest Stretch  - 2 x daily - 7 x weekly - 3 sets - 30sec hold  - Supine Figure 4 Piriformis Stretch  - 2 x daily - 7 x weekly - 3 sets - 30sec hold  - Supine Lower Trunk Rotation  - 2 x daily - 7 x weekly - 10 reps - 10sec hold  - Standing Hamstring Stretch with Step  - 2 x daily - 7 x weekly - 3 sets - 30sec hold  - Hip Flexor Stretch on Step  - 2 x daily - 7 x weekly - 3 sets - 30sec hold  - Gastroc Stretch on Step  - 2 x daily - 7 x weekly - 3 sets - 30sec hold  - Supine Transversus Abdominis Bracing - Hands on Stomach  - 2 x daily - 7 x weekly - 15 reps - 5sec hold  - Dead Bug  - 1-2 x daily - 7 x weekly - 2 sets - 10 reps  - Bridge with Resistance  - 1-2 x daily - 7 x weekly - 2 sets - 10 reps  - Clamshell with Resistance  - 1-2 x daily - 7 x weekly - 2 sets - 10 reps  - Sidelying Hip Abduction with Resistance at Thighs  - 1-2 x daily - 7 x weekly - 2 sets - 10 reps  - Standing Heel Raises  - 1-2 x daily - 7 x weekly - 2 sets - 10 reps  - Standing March with Counter Support  - 1-2 x daily - 7 x weekly - 2 sets - 10 reps  - Standing Hip Flexion with Resistance at Ankles and Unilateral Counter Support  - 1-2 x daily - 7 x weekly - 2 sets - 10 reps  - Hip Extension with Resistance Loop  - 1-2 x daily - 7 x weekly - 2 sets - ,DirectAddress_Unknown

## 2023-11-01 ENCOUNTER — HOSPITAL ENCOUNTER (OUTPATIENT)
Dept: PHYSICAL THERAPY | Facility: CLINIC | Age: 73
Setting detail: THERAPIES SERIES
Discharge: HOME | End: 2023-11-01
Payer: MEDICARE

## 2023-11-01 PROCEDURE — 97110 THERAPEUTIC EXERCISES: CPT

## 2023-11-01 NOTE — FLOWSHEET NOTE
[x] [facility]  Outpatient Rehabilitation &  Therapy  [address]  P: [phone]  F: [phone]     Physical Therapy Daily Treatment Note    Date:  2023  Patient Name:  Benjamin Cruz    :  1950  MRN: [id number]  Physician: Stephanie Gillette DO                            Insurance: Enma Calin **[id number] Double R Leticia approved 5 add'l PT vs for 10/11/23 - 12/10/23 Auth #[id number] (same cpt codes)   CPT Codes: 89211, 76447, 51189, 89153, 70392 2X/wk for 8wks (5visits)  Medical Diagnosis: M47.816 (ICD-10-CM) - Spondylosis without myelopathy or radiculopathy, lumbar region  M51.36 (ICD-10-CM) - Other intervertebral disc degeneration, lumbar region  Rehab Codes: M54.5  Onset Date: 23               Next 's appt. : --  Visit# / total visits: 10/16 (/)    Progress note for Medicare patient due at visit 10     Cancels/No Shows: 0/0    Subjective:   Pain:  [] Yes  [x] No  Location: LB    Pain Rating: (0-10 scale) 0/10  Pain altered Tx:  [x] No  [] Yes  Action:  Comments: No pain or complaints upon arrival.     Objective:  Modalities:   Precautions: Standard   Exercise  Low Back Pain Reps/ Time Weight/ Level Comments    Nustep  10'  L3   x              HS step S 3x30\"     x   SB S 3x30\"    3rd slot x   Hip flexor step S 3x30\"      x   SKTC S 3x30\"        Figure 4 S 3x30\"        LTR 10x10\"                             Bridges 2x15, 3\"  Blue    x   Clamshells 2x15  Blue   x   SL Hip abd 2x15  Blue  x   TA Sets 15x5\"      TA + Marches 2x10      Dead Bug 2x10                 TG Squats  2x15  L20   x   TB Sidestepping  3L  Blue   x   HR, Marches, 3 Way Hip  2x15  Blue  x   Mini Lunges 2x10   x   Other:     Specific Instructions for next treatment: Continue tx per POC      Treatment Charges: Mins Units   []  Modalities     [x]  Ther Exercise 45 3   []  Manual Therapy     []  Ther Activities     []  Neuro Re-ed     []  Vasocompression     [] Gait     []  Other     Total Treatment time 45

## 2023-11-06 ENCOUNTER — HOSPITAL ENCOUNTER (OUTPATIENT)
Dept: PHYSICAL THERAPY | Facility: CLINIC | Age: 73
Setting detail: THERAPIES SERIES
Discharge: HOME | End: 2023-11-06
Payer: MEDICARE

## 2023-11-06 PROCEDURE — 97110 THERAPEUTIC EXERCISES: CPT

## 2023-11-06 NOTE — FLOWSHEET NOTE
2. Reduce pain levels to 1/10 or less with activity to help ease walking recreationally. []  []  []      3. ? Strength: Increase jackie hip/core strength to 4+/5 grossly to help improve LE stability with all walking and help reduce pain with ambulation. []  []  []                        Patient goals: \"I want to be able to walk without pain daily\"      Pt. Education:  [x] Yes  [] No  [x] Reviewed Prior HEP/Ed  Method of Education: [x] Verbal  [x] Demo  [] Written  Comprehension of Education:  [x] Verbalizes understanding. [x] Demonstrates understanding. [x] Needs review. [] Demonstrates/verbalizes HEP/Ed previously given. Access Code: [email or web address]  URL: [employer]/  Date: 10/25/2023  Prepared by:  [employer]    Exercises  - Supine Single Knee to Chest Stretch  - 2 x daily - 7 x weekly - 3 sets - 30sec hold  - Supine Figure 4 Piriformis Stretch  - 2 x daily - 7 x weekly - 3 sets - 30sec hold  - Supine Lower Trunk Rotation  - 2 x daily - 7 x weekly - 10 reps - 10sec hold  - Standing Hamstring Stretch with Step  - 2 x daily - 7 x weekly - 3 sets - 30sec hold  - Hip Flexor Stretch on Step  - 2 x daily - 7 x weekly - 3 sets - 30sec hold  - Gastroc Stretch on Step  - 2 x daily - 7 x weekly - 3 sets - 30sec hold  - Supine Transversus Abdominis Bracing - Hands on Stomach  - 2 x daily - 7 x weekly - 15 reps - 5sec hold  - Dead Bug  - 1-2 x daily - 7 x weekly - 2 sets - 10 reps  - Bridge with Resistance  - 1-2 x daily - 7 x weekly - 2 sets - 10 reps  - Clamshell with Resistance  - 1-2 x daily - 7 x weekly - 2 sets - 10 reps  - Sidelying Hip Abduction with Resistance at Thighs  - 1-2 x daily - 7 x weekly - 2 sets - 10 reps  - Standing Heel Raises  - 1-2 x daily - 7 x weekly - 2 sets - 10 reps  - Standing March with Counter Support  - 1-2 x daily - 7 x weekly - 2 sets - 10 reps  - Standing Hip Flexion with Resistance at Ankles and Unilateral Counter Support  - 1-2 x daily - 7 x weekly - 2 sets - 10

## 2023-12-09 ENCOUNTER — HOSPITAL ENCOUNTER (OUTPATIENT)
Age: 73
Discharge: HOME | End: 2023-12-09
Payer: MEDICARE

## 2023-12-09 LAB
A1AT SERPL-MCNC: 105 MG/DL (ref 90–200)
ALBUMIN SERPL-MCNC: 3.9 G/DL (ref 3.5–5.2)
ALBUMIN/GLOB SERPL: 1.5 {RATIO} (ref 1–2.5)
ALP SERPL-CCNC: 54 U/L (ref 35–104)
ALT SERPL-CCNC: 15 U/L (ref 5–33)
ANION GAP SERPL CALCULATED.3IONS-SCNC: 9 MMOL/L (ref 9–17)
AST SERPL-CCNC: 13 U/L
BASOPHILS # BLD: 0.08 K/UL (ref 0–0.2)
BASOPHILS NFR BLD: 1 % (ref 0–2)
BILIRUB SERPL-MCNC: 0.3 MG/DL (ref 0.3–1.2)
BUN SERPL-MCNC: 23 MG/DL (ref 8–23)
CALCIUM SERPL-MCNC: 9.6 MG/DL (ref 8.6–10.4)
CHLORIDE SERPL-SCNC: 104 MMOL/L (ref 98–107)
CHOLEST SERPL-MCNC: 159 MG/DL
CHOLESTEROL/HDL RATIO: 2.8
CO2 SERPL-SCNC: 26 MMOL/L (ref 20–31)
CREAT SERPL-MCNC: 0.9 MG/DL (ref 0.5–0.9)
CRP SERPL HS-MCNC: <3 MG/L (ref 0–5)
EOSINOPHIL # BLD: 0.06 K/UL (ref 0–0.44)
EOSINOPHILS RELATIVE PERCENT: 1 % (ref 1–4)
ERYTHROCYTE [DISTWIDTH] IN BLOOD BY AUTOMATED COUNT: 13.3 % (ref 11.8–14.4)
ERYTHROCYTE [SEDIMENTATION RATE] IN BLOOD BY PHOTOMETRIC METHOD: 1 MM/HR (ref 0–30)
GFR SERPL CREATININE-BSD FRML MDRD: >60 ML/MIN/1.73M2
GLUCOSE SERPL-MCNC: 84 MG/DL (ref 70–99)
HCT VFR BLD AUTO: 40.4 % (ref 36.3–47.1)
HDLC SERPL-MCNC: 56 MG/DL
HGB BLD-MCNC: 13.2 G/DL (ref 11.9–15.1)
IMM GRANULOCYTES # BLD AUTO: 0.03 K/UL (ref 0–0.3)
IMM GRANULOCYTES NFR BLD: 0 %
LDLC SERPL CALC-MCNC: 84 MG/DL (ref 0–130)
LYMPHOCYTES NFR BLD: 4.81 K/UL (ref 1.1–3.7)
LYMPHOCYTES RELATIVE PERCENT: 47 % (ref 24–43)
MAGNESIUM SERPL-MCNC: 2.1 MG/DL (ref 1.6–2.6)
MCH RBC QN AUTO: 31.4 PG (ref 25.2–33.5)
MCHC RBC AUTO-ENTMCNC: 32.7 G/DL (ref 28.4–34.8)
MCV RBC AUTO: 96.2 FL (ref 82.6–102.9)
MONOCYTES NFR BLD: 0.6 K/UL (ref 0.1–1.2)
MONOCYTES NFR BLD: 6 % (ref 3–12)
NEUTROPHILS NFR BLD: 45 % (ref 36–65)
NEUTS SEG NFR BLD: 4.56 K/UL (ref 1.5–8.1)
NRBC BLD-RTO: 0 PER 100 WBC
PLATELET # BLD AUTO: 296 K/UL (ref 138–453)
PMV BLD AUTO: 11.1 FL (ref 8.1–13.5)
POTASSIUM SERPL-SCNC: 3.9 MMOL/L (ref 3.7–5.3)
PROT SERPL-MCNC: 6.5 G/DL (ref 6.4–8.3)
RBC # BLD AUTO: 4.2 M/UL (ref 3.95–5.11)
SODIUM SERPL-SCNC: 139 MMOL/L (ref 135–144)
TRIGL SERPL-MCNC: 96 MG/DL
TSH SERPL DL<=0.05 MIU/L-ACNC: 2.32 UIU/ML (ref 0.3–5)
WBC OTHER # BLD: 10.1 K/UL (ref 3.5–11.3)

## 2023-12-09 PROCEDURE — 83036 HEMOGLOBIN GLYCOSYLATED A1C: CPT

## 2023-12-09 PROCEDURE — 82103 ALPHA-1-ANTITRYPSIN TOTAL: CPT

## 2023-12-09 PROCEDURE — 80061 LIPID PANEL: CPT

## 2023-12-09 PROCEDURE — 82746 ASSAY OF FOLIC ACID SERUM: CPT

## 2023-12-09 PROCEDURE — 85025 COMPLETE CBC W/AUTO DIFF WBC: CPT

## 2023-12-09 PROCEDURE — 84630 ASSAY OF ZINC: CPT

## 2023-12-09 PROCEDURE — 82607 VITAMIN B-12: CPT

## 2023-12-09 PROCEDURE — 80053 COMPREHEN METABOLIC PANEL: CPT

## 2023-12-09 PROCEDURE — 36415 COLL VENOUS BLD VENIPUNCTURE: CPT

## 2023-12-09 PROCEDURE — 86140 C-REACTIVE PROTEIN: CPT

## 2023-12-09 PROCEDURE — 85652 RBC SED RATE AUTOMATED: CPT

## 2023-12-09 PROCEDURE — 83735 ASSAY OF MAGNESIUM: CPT

## 2023-12-09 PROCEDURE — 84443 ASSAY THYROID STIM HORMONE: CPT

## 2023-12-10 LAB
EST. AVERAGE GLUCOSE BLD GHB EST-MCNC: 108 MG/DL
HBA1C MFR BLD: 5.4 % (ref 4–6)

## 2023-12-12 LAB — ZINC SERPL-MCNC: 84.8 UG/DL (ref 60–120)

## 2023-12-13 LAB
FOLATE SERPL-MCNC: 9.4 NG/ML (ref 4.8–24.2)
VIT B12 SERPL-MCNC: 251 PG/ML (ref 232–1245)

## 2024-03-06 ENCOUNTER — HOSPITAL ENCOUNTER (OUTPATIENT)
Age: 74
Discharge: HOME | End: 2024-03-06
Payer: MEDICARE

## 2024-03-06 LAB
25(OH)D3 SERPL-MCNC: 39.8 NG/ML (ref 30–100)
BASOPHILS # BLD: 0.08 K/UL (ref 0–0.2)
BASOPHILS NFR BLD: 1 % (ref 0–2)
EOSINOPHIL # BLD: 0.09 K/UL (ref 0–0.44)
EOSINOPHILS RELATIVE PERCENT: 1 % (ref 1–4)
ERYTHROCYTE [DISTWIDTH] IN BLOOD BY AUTOMATED COUNT: 12.4 % (ref 11.8–14.4)
HCT VFR BLD AUTO: 40.9 % (ref 36.3–47.1)
HGB BLD-MCNC: 13.5 G/DL (ref 11.9–15.1)
IMM GRANULOCYTES # BLD AUTO: <0.03 K/UL (ref 0–0.3)
IMM GRANULOCYTES NFR BLD: 0 %
LYMPHOCYTES NFR BLD: 2.08 K/UL (ref 1.1–3.7)
LYMPHOCYTES RELATIVE PERCENT: 33 % (ref 24–43)
MCH RBC QN AUTO: 31.9 PG (ref 25.2–33.5)
MCHC RBC AUTO-ENTMCNC: 33 G/DL (ref 28.4–34.8)
MCV RBC AUTO: 96.7 FL (ref 82.6–102.9)
MONOCYTES NFR BLD: 0.4 K/UL (ref 0.1–1.2)
MONOCYTES NFR BLD: 6 % (ref 3–12)
NEUTROPHILS NFR BLD: 59 % (ref 36–65)
NEUTS SEG NFR BLD: 3.73 K/UL (ref 1.5–8.1)
NRBC BLD-RTO: 0 PER 100 WBC
PLATELET # BLD AUTO: 250 K/UL (ref 138–453)
PMV BLD AUTO: 11.2 FL (ref 8.1–13.5)
RBC # BLD AUTO: 4.23 M/UL (ref 3.95–5.11)
WBC OTHER # BLD: 6.4 K/UL (ref 3.5–11.3)

## 2024-03-06 PROCEDURE — 85025 COMPLETE CBC W/AUTO DIFF WBC: CPT

## 2024-03-06 PROCEDURE — 36415 COLL VENOUS BLD VENIPUNCTURE: CPT

## 2024-03-06 PROCEDURE — 82306 VITAMIN D 25 HYDROXY: CPT

## 2024-04-22 ENCOUNTER — HOSPITAL ENCOUNTER (OUTPATIENT)
Age: 74
Setting detail: SPECIMEN
Discharge: HOME | End: 2024-04-22
Payer: MEDICARE

## 2024-04-22 PROCEDURE — 87506 IADNA-DNA/RNA PROBE TQ 6-11: CPT

## 2024-04-23 LAB
CAMPYLOBACTER DNA SPEC NAA+PROBE: NORMAL
ETEC ELTA+ESTB GENES STL QL NAA+PROBE: NORMAL
P SHIGELLOIDES DNA STL QL NAA+PROBE: NORMAL
SALMONELLA DNA SPEC QL NAA+PROBE: NORMAL
SHIGA TOXIN STX GENE SPEC NAA+PROBE: NORMAL
SHIGELLA DNA SPEC QL NAA+PROBE: NORMAL
SPECIMEN DESCRIPTION: NORMAL
V CHOL+PARA RFBL+TRKH+TNAA STL QL NAA+PR: NORMAL
Y ENTERO RECN STL QL NAA+PROBE: NORMAL

## 2024-04-30 ENCOUNTER — HOSPITAL ENCOUNTER (OUTPATIENT)
Dept: PHYSICAL THERAPY | Facility: CLINIC | Age: 74
Setting detail: THERAPIES SERIES
Discharge: HOME | End: 2024-04-30
Payer: MEDICARE

## 2024-04-30 PROCEDURE — 97110 THERAPEUTIC EXERCISES: CPT

## 2024-04-30 PROCEDURE — 97161 PT EVAL LOW COMPLEX 20 MIN: CPT

## 2024-05-03 ENCOUNTER — HOSPITAL ENCOUNTER (OUTPATIENT)
Dept: PHYSICAL THERAPY | Facility: CLINIC | Age: 74
Setting detail: THERAPIES SERIES
Discharge: HOME | End: 2024-05-03
Payer: MEDICARE

## 2024-05-03 PROCEDURE — 97113 AQUATIC THERAPY/EXERCISES: CPT

## 2024-05-03 NOTE — FLOWSHEET NOTE
[x] [facility] Outpatient Rehabilitation & Therapy  [address]  P: [phone]  F: [phone]     Physical Therapy Daily  Aquatic Treatment Note    Date:  5/3/2024  Patient Name:  Jovana Freitas    :  1950  MRN: [id number]  Physician: Kirsten Ahumada DO                                    Insurance: [facility] MEDICARE [facility] HMO.  VISITS BASED ON MEDICAL NECESSITY.AUTH AFTER EVAL THROUGH CARELON  6 visits  (7 total) 50789, 72310, 48020  Medical Diagnosis: Spinal stenosis of lumbar region with neurogenic claudication, spondylosis without myelopathy or radiculopathy, lumbar region, intervertebral disc degeneration, lumbar region                        Rehab Codes: M54.50  Onset Date: 24               Next 's appt.: unknown    Visit# / total visits:   Cancels/No Shows: 0/0    Subjective:    Pain:  [] Yes  [x] No Location: N/A Pain Rating: (0-10 scale) 0/10  Pain altered Tx:  [] No  [] Yes  Action:  Comments: Pt reports she is not painful at the moment. Mainly painful transitioning from sit to stand and if she turns a certain way.     Objective:   Precautions: on blood thinners   KEY  B = Belt G = Gloves N = Noodle   C = Cuffs K = Kickboard P = Paddles   CC = Cervical Collar L = Laps T = Theratube   DB = Dumbells M = Minutes W = Weights     Exercises/Activities  Warm-up/Amb 5/3 Dynamic Exercises 5/3   Forward 3L March 3L   Sideways 3L Squat    Backwards 3L Retro HS curls      Retro SLR    Stretches  Braiding    Gastroc/Soleus  Heel to Toe amb    Hamstring  Toe amb    Hip flexor 3x20\" Heel amb    Piriformis 3x20\"     SKTC 3x20\"     Pec Stretch      Post Deltoid  Static Exercises UE Sm white foam tube     Shoulder flex/ext 2x10   Static Exercises LE  Shoulder abd/add 2x10   Heel/toe raises 2x10 Shoulder H.  abd/add 2x10   Marches 2x10 Shoulder IR/ER    Mini-squats 2x10 Rowing    4-way hip  2x10 Arm Circles    Hamstring curls 2x10 UT shrugs/rolls    Hip

## 2024-05-06 ENCOUNTER — HOSPITAL ENCOUNTER (OUTPATIENT)
Dept: PHYSICAL THERAPY | Facility: CLINIC | Age: 74
Setting detail: THERAPIES SERIES
Discharge: HOME | End: 2024-05-06
Payer: MEDICARE

## 2024-05-06 PROCEDURE — 97113 AQUATIC THERAPY/EXERCISES: CPT

## 2024-05-06 NOTE — FLOWSHEET NOTE
[x] [facility] Outpatient Rehabilitation & Therapy  [address]  P: [phone]  F: [phone]     Physical Therapy Daily  Aquatic Treatment Note    Date:  2024  Patient Name:  Jovana Freitas    :  1950  MRN: [id number]  Physician: Kirsten Ahumada DO                                    Insurance: [facility] MEDICARE [facility] HMO.  VISITS BASED ON MEDICAL NECESSITY.AUTH AFTER EVAL THROUGH CARELON  6 visits  (7 total) 82922, 67525, [id number]  Medical Diagnosis: Spinal stenosis of lumbar region with neurogenic claudication, spondylosis without myelopathy or radiculopathy, lumbar region, intervertebral disc degeneration, lumbar region                        Rehab Codes: M54.50  Onset Date: 24               Next 's appt.: unknown    Visit# / total visits: 3/20  Cancels/No Shows: 0/0    Subjective:    Pain:  [] Yes  [x] No Location: N/A Pain Rating: (0-10 scale) 5/10  Pain altered Tx:  [] No  [] Yes  Action:  Comments: Pt arrives 30 min late, able to accommodate. Pt reporting soreness at this time due to just twisting to get out of car. Notes very minimal soreness after last treatment.     Objective:   Precautions: on blood thinners   KEY  B = Belt G = Gloves N = Noodle   C = Cuffs K = Kickboard P = Paddles   CC = Cervical Collar L = Laps T = Theratube   DB = Dumbells M = Minutes W = Weights     Exercises/Activities  Warm-up/Amb 5/6 Dynamic Exercises 5/6   Forward 3L March 3L   Sideways 3L Squat    Backwards 3L Retro HS curls      Retro SLR    Stretches  Braiding    Gastroc/Soleus  Heel to Toe amb    Hamstring  Toe amb    Hip flexor 3x20\" Heel amb    Piriformis 3x20\"     SKTC 3x20\"     Pec Stretch      Post Deltoid  Static Exercises UE Sm white foam tube     Shoulder flex/ext 3x10   Static Exercises LE  Shoulder abd/add 3x10   Heel/toe raises 3x10 Shoulder H.  abd/add 3x10   Marches 3x10 Shoulder IR/ER    Mini-squats 3x10 Rowing 3x10   4-way hip  3x10 Arm Circles

## 2024-05-08 ENCOUNTER — HOSPITAL ENCOUNTER (OUTPATIENT)
Dept: PHYSICAL THERAPY | Facility: CLINIC | Age: 74
Setting detail: THERAPIES SERIES
Discharge: HOME | End: 2024-05-08
Payer: MEDICARE

## 2024-05-08 PROCEDURE — 97113 AQUATIC THERAPY/EXERCISES: CPT

## 2024-05-08 NOTE — FLOWSHEET NOTE
[x] [facility] Outpatient Rehabilitation & Therapy  [address]  P: [phone]  F: [phone]     Physical Therapy Daily Aquatic Treatment Note    Date:  2024  Patient Name:  Jovana Freitas    :  1950  MRN: [id number]  Physician: Kirsten Ahumada DO                                    Insurance: [facility] MEDICARE [facility] HMO.  VISITS BASED ON MEDICAL NECESSITY.AUTH AFTER EVAL THROUGH CARELON  6 visits  (7 total) 71852, 03639, 22431  Medical Diagnosis: Spinal stenosis of lumbar region with neurogenic claudication, spondylosis without myelopathy or radiculopathy, lumbar region, intervertebral disc degeneration, lumbar region                        Rehab Codes: M54.50  Onset Date: 24               Next 's appt.: unknown    Visit# / total visits:  (4/)  Cancels/No Shows: 0/0    Subjective:    Pain:  [] Yes  [x] No Location: N/A Pain Rating: (0-10 scale) 5/10  Pain altered Tx:  [] No  [] Yes  Action:  Comments: No change with pain level, pretty sore after therapy sessions. Most painful at night and is only comfortable lying supine. Meeting with pain mgmt today.     Objective:   Precautions: on blood thinners   KEY  B = Belt G = Gloves N = Noodle   C = Cuffs K = Kickboard P = Paddles   CC = Cervical Collar L = Laps T = Theratube   DB = Dumbells M = Minutes W = Weights     Exercises/Activities  Warm-up/Amb 5/8 Dynamic Exercises 5/8   Forward 3L March 3L   Sideways 3L Squat    Backwards 3L Retro HS curls      Retro SLR    Stretches  Braiding    Gastroc/Soleus  Heel to Toe amb    Hamstring  Toe amb    Hip flexor 3x20\" Heel amb    Piriformis 3x20\"     SKTC 3x20\"     Pec Stretch      Post Deltoid  Static Exercises UE Sm white foam tube     Shoulder flex/ext 3x10   Static Exercises LE  Shoulder abd/add 3x10   Heel/toe raises 3x10 Shoulder H.  abd/add 3x10   Marches 3x10 Shoulder IR/ER    Mini-squats 3x10 Rowing 3x10   4-way hip  3x10 Arm Circles    Hamstring curls

## 2024-05-13 ENCOUNTER — HOSPITAL ENCOUNTER (OUTPATIENT)
Dept: PHYSICAL THERAPY | Facility: CLINIC | Age: 74
Setting detail: THERAPIES SERIES
Discharge: HOME | End: 2024-05-13
Payer: MEDICARE

## 2024-05-13 PROCEDURE — 97113 AQUATIC THERAPY/EXERCISES: CPT

## 2024-05-13 NOTE — FLOWSHEET NOTE
Demonstrates understanding.  [] Needs review.  [] Demonstrates/verbalizes HEP/Ed previously given.     Plan: [x] Continue per plan of care.   [] Other:      Time In: 9:50 am            Time Out: 10:55 am    Electronically signed by:  Sammi Sams PTA

## 2024-05-15 ENCOUNTER — HOSPITAL ENCOUNTER (OUTPATIENT)
Dept: PHYSICAL THERAPY | Facility: CLINIC | Age: 74
Setting detail: THERAPIES SERIES
Discharge: HOME | End: 2024-05-15
Payer: MEDICARE

## 2024-05-15 NOTE — FLOWSHEET NOTE
[] [facility]  Outpatient Rehabilitation &  Therapy  [address]  [phone]  [phone] [] [facility]  Outpatient Rehabilitation &  Therapy  [address]  P: [phone]  F: [phone] [x] [facility]  Outpatient Rehabilitation &  Therapy  [address]  P: [phone]  F: [phone] [] [facility]  Outpatient Rehabilitation &  Therapy  [address]  [phone]  [phone] [] [facility]  Outpatient Rehabilitation &  Therapy  [address]   P: [phone]  F: [phone]  [] [facility]  Outpatient Rehabilitation &  Therapy  [address]  P: [phone]  F: [phone] [] [facility]  Outpatient Rehabilitation &  Therapy  [address]  P: [phone]  F: [phone] [] [facility]  Outpatient Rehabilitation &  Therapy  [address]  P: [phone]  F: [phone] [] [facility]  Outpatient Rehabilitation &  Therapy  [address]  P: [phone]  F: [phone]  [] [facility] Outpatient Rehabilitation &  Therapy  [address]  P: [phone]  F: [phone]     Therapy Cancel/No Show note    Date: 5/15/2024  Patient: Jovana SHARMA Freitas  : 1950  MRN: [id number]    Cancels/No Shows to date: 10    For today's appointment patient:    [x]  Cancelled    [] Rescheduled appointment    [] No-show     Reason given by patient:    []  Patient ill    [x]  Conflicting appointment    [] No transportation      [] Conflict with work    [] No reason given    [] Weather related    [] COVID-19    [x] Other:      Comments:  Patient called into  to cx today's appointment d/t discovering her  had an appointment at the same time that she needed to attend. Patient will call back after appointment to

## 2024-05-17 ENCOUNTER — HOSPITAL ENCOUNTER (OUTPATIENT)
Dept: PHYSICAL THERAPY | Facility: CLINIC | Age: 74
Setting detail: THERAPIES SERIES
Discharge: HOME | End: 2024-05-17
Payer: MEDICARE

## 2024-05-17 PROCEDURE — 97113 AQUATIC THERAPY/EXERCISES: CPT

## 2024-05-17 NOTE — FLOWSHEET NOTE
[x] [facility] Outpatient Rehabilitation & Therapy  [address]  P: [phone]  F: [phone]     Physical Therapy Daily Aquatic Treatment Note    Date:  2024  Patient Name:  Jovana Freitas    :  1950  MRN: [id number]  Physician: Kirsten Ahumada DO                                    Insurance: [facility] MEDICARE [facility] HMO.  VISITS BASED ON MEDICAL NECESSITY.AUTH AFTER EVAL THROUGH CARELON  6 visits  (7 total) 89495, 72561, 88836  Medical Diagnosis: Spinal stenosis of lumbar region with neurogenic claudication, spondylosis without myelopathy or radiculopathy, lumbar region, intervertebral disc degeneration, lumbar region                        Rehab Codes: M54.50  Onset Date: 24               Next 's appt.: unknown    Visit# / total visits:  (7)  Cancels/No Shows: 0/0    Subjective:    Pain:  [] Yes  [x] No Location: N/A Pain Rating: (0-10 scale) 4/10  Pain altered Tx:  [] No  [] Yes  Action:  Comments: Pt reports she was sore earlier after bending over, pain seems to be mostly at night. A little soreness after therapy appts.      Objective:   Precautions: on blood thinners   KEY  B = Belt G = Gloves N = Noodle   C = Cuffs K = Kickboard P = Paddles   CC = Cervical Collar L = Laps T = Theratube   DB = Dumbells M = Minutes W = Weights     Exercises/Activities  Warm-up/Amb  Dynamic Exercises    Forward 3L March 3L   Sideways 3L Squat    Backwards 3L Retro HS curls      Retro SLR    Stretches  Braiding    Gastroc/Soleus  Heel to Toe amb    Hamstring  Toe amb    Hip flexor 3x20\" Heel amb    Piriformis 3x20\"     SKTC 3x20\"     Pec Stretch      Post Deltoid  Static Exercises UE Sm white foam tube     Shoulder flex/ext 3x10   Static Exercises LE  Shoulder abd/add 3x10   Heel/toe raises 3x10 Shoulder H.  abd/add 3x10   Marches 3x10 Shoulder IR/ER    Mini-squats 3x10 Rowing 3x10   4-way hip  3x10 Arm Circles    Hamstring curls 3x10 UT shrugs/rolls

## 2024-05-20 ENCOUNTER — APPOINTMENT (OUTPATIENT)
Dept: PHYSICAL THERAPY | Facility: CLINIC | Age: 74
End: 2024-05-20
Payer: MEDICARE

## 2024-05-22 ENCOUNTER — APPOINTMENT (OUTPATIENT)
Dept: PHYSICAL THERAPY | Facility: CLINIC | Age: 74
End: 2024-05-22
Payer: MEDICARE

## 2024-05-23 ENCOUNTER — HOSPITAL ENCOUNTER (OUTPATIENT)
Dept: PHYSICAL THERAPY | Facility: CLINIC | Age: 74
Setting detail: THERAPIES SERIES
Discharge: HOME | End: 2024-05-23
Payer: MEDICARE

## 2024-05-23 ENCOUNTER — APPOINTMENT (OUTPATIENT)
Dept: PHYSICAL THERAPY | Facility: CLINIC | Age: 74
End: 2024-05-23
Payer: MEDICARE

## 2024-05-23 NOTE — FLOWSHEET NOTE
[] [facility]  Outpatient Rehabilitation &  Therapy  [address]  [phone]  [phone] [] [facility]  Outpatient Rehabilitation &  Therapy  [address]  P: [phone]  F: [phone] [x] [facility]  Outpatient Rehabilitation &  Therapy  [address]  P: [phone]  F: [phone] [] [facility]  Outpatient Rehabilitation &  Therapy  [address]  [phone]  [phone] [] [facility]  Outpatient Rehabilitation &  Therapy  [address]   P: [phone]  F: [phone]  [] [facility]  Outpatient Rehabilitation &  Therapy  [address]  P: [phone]  F: [phone] [] [facility]  Outpatient Rehabilitation &  Therapy  [address]  P: [phone]  F: [phone] [] [facility]  Outpatient Rehabilitation &  Therapy  [address]  P: [phone]  F: [phone] [] [facility]  Outpatient Rehabilitation &  Therapy  [address]  P: [phone]  F: [phone]  [] [facility] Outpatient Rehabilitation &  Therapy  [address]  P: [phone]  F: [phone]     Therapy Cancel/No Show note    Date: 2024  Patient: Jovana Freitas  : 1950  MRN: [id number]    Cancels/No Shows to date: 2    For today's appointment patient:    [x]  Cancelled    [] Rescheduled appointment    [] No-show     Reason given by patient:    []  Patient ill    []  Conflicting appointment    [] No transportation      [] Conflict with work    [x] No reason given    [] Weather related    [] COVID-19    [] Other:      Comments:        [x] Next appointment was confirmed    Electronically signed by: Maryuri Rojo PT

## 2024-06-03 ENCOUNTER — HOSPITAL ENCOUNTER (OUTPATIENT)
Dept: PHYSICAL THERAPY | Facility: CLINIC | Age: 74
Setting detail: THERAPIES SERIES
Discharge: HOME | End: 2024-06-03
Payer: MEDICARE

## 2024-06-03 PROCEDURE — 97110 THERAPEUTIC EXERCISES: CPT

## 2024-06-03 PROCEDURE — 97113 AQUATIC THERAPY/EXERCISES: CPT

## 2024-06-03 NOTE — FLOWSHEET NOTE
[x] [facility] Outpatient Rehabilitation & Therapy  [address]  P: [phone]  F: [phone]     Physical Therapy Daily Aquatic Treatment Note    Date:  6/3/2024  Patient Name:  Jovana Freitas    :  1950  MRN: [id number]  Physician: Kirsten Ahumada DO                                    Insurance: [facility] MEDICARE [facility] HMO.  VISITS BASED ON MEDICAL NECESSITY.AUTH AFTER EVAL THROUGH [facility]  6 visits  (7 total) 19297, 11400, [id number]  Medical Diagnosis: Spinal stenosis of lumbar region with neurogenic claudication, spondylosis without myelopathy or radiculopathy, lumbar region, intervertebral disc degeneration, lumbar region                        Rehab Codes: M54.50  Onset Date: 24               Next 's appt.: unknown    Visit# / total visits:  ()  Cancels/No Shows: 1/0    Subjective:    Pain:  [] Yes  [x] No Location: N/A Pain Rating: (0-10 scale) \"just a little sore\"/10  Pain altered Tx:  [] No  [] Yes  Action:  Comments: Pt reports \"just a little soreness\" since coming from land appt, re eval.     Objective:   Precautions: on blood thinners   KEY  B = Belt G = Gloves N = Noodle   C = Cuffs K = Kickboard P = Paddles   CC = Cervical Collar L = Laps T = Theratube   DB = Dumbells M = Minutes W = Weights     Exercises/Activities  Warm-up/Amb 6/3 Dynamic Exercises 6/3   Forward 3L March 3L   Sideways 3L Squat    Backwards 3L Retro HS curls      Retro SLR    Stretches  Braiding    Gastroc/Soleus  Heel to Toe amb    Hamstring  Toe amb    Hip flexor 3x20\" Heel amb    Piriformis 3x20\"     SKTC 3x20\"     Pec Stretch      Post Deltoid  Static Exercises UE Sm white foam tube     Shoulder flex/ext 3x10   Static Exercises LE  Shoulder abd/add 3x10   Heel/toe raises 3x10 Shoulder H.  abd/add 3x10   Marches 3x10 Shoulder IR/ER    Mini-squats 3x10 Rowing 3x10   4-way hip  3x10 Arm Circles    Hamstring curls 3x10 UT shrugs/rolls    Hip Circles/Fig 8  Scap squeezes

## 2024-06-03 NOTE — PROGRESS NOTES
[] [facility]  Outpatient Rehabilitation &  Therapy  [address]  [phone]  [phone] [] [facility]  Outpatient Rehabilitation &  Therapy  [address]  P: [phone]  F: [phone] [x] [facility]  Outpatient Rehabilitation &  Therapy  [address]  P: [phone]  F: [phone] [] [facility]  Outpatient Rehabilitation &  Therapy  [address]  [phone]  [phone] [] [facility]  Outpatient Rehabilitation &  Therapy  [address]   P: [phone]  F: [phone]  [] [facility]  Outpatient Rehabilitation &  Therapy  [address]  P: [phone]  F: [phone] [] [facility]  Outpatient Rehabilitation &  Therapy  [address]  P: [phone]  F: [phone] [] [facility]  Outpatient Rehabilitation &  Therapy  [address]  P: [phone]  F: [phone] [] [facility]  Outpatient Rehabilitation &  Therapy  [address]  P: [phone]  F: [phone]  [] [facility] Outpatient Rehabilitation &  Therapy  [address]  P: [phone]  F: [phone]     Physical Therapy Daily Treatment Note    Date:  6/3/2024  Patient Name:  Jovana SHARMA Freitas    :  1950  MRN: [id number]  Physician: Kirsten Ahumada DO                                    Insurance: [employer] ADV HMO.  VISITS BASED ON MEDICAL NECESSITY.AUTH AFTER EVAL THROUGH [facility]  6 visits  (7 total) 27844, 30022, 78796  Medical Diagnosis: Spinal stenosis of lumbar region with neurogenic claudication, spondylosis without myelopathy or radiculopathy, lumbar region, intervertebral disc degeneration, lumbar region                        Rehab Codes: M54.50  Onset Date: 24               Next 's appt.: unknown  Visit#

## 2024-06-12 ENCOUNTER — HOSPITAL ENCOUNTER (OUTPATIENT)
Dept: PHYSICAL THERAPY | Facility: CLINIC | Age: 74
Setting detail: THERAPIES SERIES
Discharge: HOME | End: 2024-06-12
Payer: MEDICARE

## 2024-06-12 PROCEDURE — 97113 AQUATIC THERAPY/EXERCISES: CPT

## 2024-06-12 NOTE — FLOWSHEET NOTE
[x] [facility] Outpatient Rehabilitation & Therapy  [address]  P: [phone]  F: [phone]     Physical Therapy Daily Aquatic Treatment Note    Date:  2024  Patient Name:  Jovana Freitas    :  1950  MRN: [id number]  Physician: Kirsten Ahumada DO                                    Insurance: [facility] MEDICARE [facility] HMO.  VISITS BASED ON MEDICAL NECESSITY.AUTH AFTER EVAL THROUGH [facility]  6 visits - (7 total) 73561, 29981, 94400+ 4 visits 24-24 (11 total)  Medical Diagnosis: Spinal stenosis of lumbar region with neurogenic claudication, spondylosis without myelopathy or radiculopathy, lumbar region, intervertebral disc degeneration, lumbar region                        Rehab Codes: M54.50  Onset Date: 24               Next 's appt.: unknown    Visit# / total visits:  ()  Cancels/No Shows: 1/    Subjective:    Pain:  [x] Yes  [] No Location: LB, L hip Pain Rating: (0-10 scale) 7/10  Pain altered Tx:  [x] No  [] Yes  Action:  Comments: pt arrives today stating she feels like her pain is getting worse although she does not think therapy tx are contributing to her pain. States that over the weekend she was on a walking trail with her  where she slipped and fell onto her L hip, which is now causing her some pain. States she was able to get up with help from her  and finish her walk. Also states she was mopping her floors on Monday which caused an inc in LB pain. Reports using heat to help alleviate the pain, but once she takes the heat off the pain is immediately back. States LB pain has inc with sitting, especially in the car.     Objective:   Precautions: on blood thinners   KEY  B = Belt G = Gloves N = Noodle   C = Cuffs K = Kickboard P = Paddles   CC = Cervical Collar L = Laps T = Theratube   DB = Dumbells M = Minutes W = Weights     Exercises/Activities  Warm-up/Amb  Dynamic Exercises

## 2024-06-19 ENCOUNTER — HOSPITAL ENCOUNTER (OUTPATIENT)
Dept: PHYSICAL THERAPY | Facility: CLINIC | Age: 74
Setting detail: THERAPIES SERIES
Discharge: HOME | End: 2024-06-19
Payer: MEDICARE

## 2024-06-19 PROCEDURE — 97113 AQUATIC THERAPY/EXERCISES: CPT

## 2024-06-19 NOTE — FLOWSHEET NOTE
[x] [facility] Outpatient Rehabilitation & Therapy  [address]  P: [phone]  F: [phone]     Physical Therapy Daily Aquatic Treatment Note    Date:  2024  Patient Name:  Jovana Freitas    :  1950  MRN: [id number]  Physician: Kirsten Ahumada DO                                    Insurance: [facility] MEDICARE [facility] HMO.  VISITS BASED ON MEDICAL NECESSITY.AUTH AFTER EVAL THROUGH [facility]  6 visits - (7 total) 05937, 28988, 10586+ 4 visits 24-24 (11 total)  Medical Diagnosis: Spinal stenosis of lumbar region with neurogenic claudication, spondylosis without myelopathy or radiculopathy, lumbar region, intervertebral disc degeneration, lumbar region                        Rehab Codes: M54.50  Onset Date: 24               Next 's appt.: unknown    Visit# / total visits:  ()  Cancels/No Shows: 1/0    Subjective:    Pain:  [x] Yes  [] No Location: LB, L hip Pain Rating: (0-10 scale) 4/10  Pain altered Tx:  [x] No  [] Yes  Action:  Comments: pt arrives today stating she is having some pain, but is feeling a little better.     Objective:   Precautions: on blood thinners   KEY  B = Belt G = Gloves N = Noodle   C = Cuffs K = Kickboard P = Paddles   CC = Cervical Collar L = Laps T = Theratube   DB = Dumbells M = Minutes W = Weights     Exercises/Activities  Warm-up/Amb  Dynamic Exercises    Forward 3L March 3L   Sideways 3L Squat    Backwards 3L Retro HS curls 3L     Retro SLR    Stretches  Braiding    Gastroc/Soleus  Heel to Toe amb    Hamstring  Toe amb    Hip flexor 3x20\" Heel amb    Piriformis 3x20\"     SKTC 3x20\"     Pec Stretch      Post Deltoid  Static Exercises UE gloves     Shoulder flex/ext 30   Static Exercises LE No UE support Shoulder abd/add 30   Heel/toe raises 30 Shoulder H.  abd/add 30   Marches 30 Shoulder IR/ER    Mini-squats 30 Rowing 30   4-way hip  30 Arm Circles    Hamstring curls 30 UT shrugs/rolls    Hip Circles/Fig

## 2024-06-24 ENCOUNTER — HOSPITAL ENCOUNTER (OUTPATIENT)
Dept: PHYSICAL THERAPY | Facility: CLINIC | Age: 74
Setting detail: THERAPIES SERIES
Discharge: HOME | End: 2024-06-24
Payer: MEDICARE

## 2024-06-24 PROCEDURE — 97113 AQUATIC THERAPY/EXERCISES: CPT

## 2024-06-24 NOTE — FLOWSHEET NOTE
ROM  Diagonals 1/2    Lunges  15 ea Elbow flex/ext      Pron/Sup    Functional Exercise  Wrist AROM    Step 20x ea fwd/lat     Wall Push-ups  Deep H20/    SLS  Bike 3m   Breast Stroke on Noodle  Hip abd/add 3m   Noodle Twist  Hip flex/ext    Noodle Push down  Hip IR/ER 3m   Kickboard push/pull 30x Knee flex/ext      Push/pull on Rail      Hang 5m   Other:    Specific Instructions for next treatment:    Treatment Charges: Mins Units   []  Modalities     []  Ther Exercise     []  Manual Therapy     []  Ther Activities     [x]  Aquatics 30 2   []  Other       Assessment:   [x] Progressing toward goals. Began tx with warm up laps followed by ex and stretching per above log. Addition of lunges, requiring cues to avoid ant knees and maintain PPT.  Edu pt on TA contraction throughout all interventions for additional stability, as pt demo increased difficulty with UE ex.  Concluded with deep water interventions for further decompression and to reduce soreness post tx. Reports feeling well after treatment. Plan for re eval after next aquatics treatment.    [] No change.     [] Other:    STG: (to be met in 10 treatments)  ? Pain: lumbar to 4/10 at worst to improve her sitting tolerance  ? ROM: lumbar extension to no >50% limit to improve her walking tolerance  ? Strength: right hip abductor to 4+/5 to improve walking tolerance  Patient to be independent with home exercise program as demonstrated by performance with correct form without cues.  Demonstrate Knowledge of fall prevention  LTG: (to be met in 20 treatments)  ISSAC 20% or less impaired         Patient goals: \"move freely without pain\"    Pt. Education:  [x] Yes  [] No  [] Reviewed Prior HEP/Ed  Method of Education: [x] Verbal  [x] Demo  [] Written  Comprehension of Education:  [x] Verbalizes understanding.  [x] Demonstrates understanding.  [] Needs review.  [] Demonstrates/verbalizes HEP/Ed previously given.     Plan: [x] Continue per plan of care.   []

## 2024-06-26 ENCOUNTER — HOSPITAL ENCOUNTER (OUTPATIENT)
Dept: PHYSICAL THERAPY | Facility: CLINIC | Age: 74
Setting detail: THERAPIES SERIES
Discharge: HOME | End: 2024-06-26
Payer: MEDICARE

## 2024-06-26 PROCEDURE — 97110 THERAPEUTIC EXERCISES: CPT

## 2024-06-26 PROCEDURE — 97113 AQUATIC THERAPY/EXERCISES: CPT

## 2024-06-26 NOTE — FLOWSHEET NOTE
understanding.  [] Needs review.  [] Demonstrates/verbalizes HEP/Ed previously given.     Plan: [x] D/C with home program   [] Other:      Time In: 10:00 am            Time Out: 10:50 am    Electronically signed by:  Dyaana Rodrigues PTA

## 2024-06-26 NOTE — THERAPY DISCHARGE
[] [facility]  Outpatient Rehabilitation &  Therapy  [address]  [phone]  [phone] [] [facility]  Outpatient Rehabilitation &  Therapy  [address]  P: [phone]  F: [phone] [x] [facility]  Outpatient Rehabilitation &  Therapy  [address]  P: [phone]  F: [phone] [] [facility]  Outpatient Rehabilitation &  Therapy  [address]  [phone]  [phone] [] [facility]  Outpatient Rehabilitation &  Therapy  [address]   P: [phone]  F: [phone]  [] [facility]  Outpatient Rehabilitation &  Therapy  [address]  P: [phone]  F: [phone] [] [facility]  Outpatient Rehabilitation &  Therapy  [address]  P: [phone]  F: [phone] [] [facility]  Outpatient Rehabilitation &  Therapy  [address]  P: [phone]  F: [phone] [] [facility]  Outpatient Rehabilitation &  Therapy  [address]  P: [phone]  F: [phone]  [] [facility] Outpatient Rehabilitation &  Therapy  [address]  P: [phone]  F: [phone]     Physical Therapy Daily Treatment Note/DC Note    Date:  2024  Patient Name:  Jovana SHARMA Fortino    :  1950  MRN: [id number]  Physician: Kirsten Ahumada DO                                    Insurance: [employer] ADV HMO.  VISITS BASED ON MEDICAL NECESSITY.AUTH AFTER EVAL THROUGH [facility]  6 visits - (7 total) 82814, 70543, 98468 + 4 visits 24-24 (11 total)  Medical Diagnosis: Spinal stenosis of lumbar region with neurogenic claudication, spondylosis without myelopathy or radiculopathy, lumbar region, intervertebral disc degeneration, lumbar region                        Rehab Codes: M54.50  Onset Date: 24

## 2025-01-02 ENCOUNTER — HOSPITAL ENCOUNTER (OUTPATIENT)
Dept: MAMMOGRAPHY | Age: 75
Discharge: HOME | End: 2025-01-04
Attending: FAMILY MEDICINE
Payer: MEDICARE

## 2025-01-02 VITALS — HEIGHT: 63 IN | WEIGHT: 190 LBS | BODY MASS INDEX: 33.66 KG/M2

## 2025-01-02 DIAGNOSIS — Z12.31 ENCOUNTER FOR SCREENING MAMMOGRAM FOR MALIGNANT NEOPLASM OF BREAST: ICD-10-CM

## 2025-01-02 DIAGNOSIS — M85.80 OTHER SPECIFIED DISORDERS OF BONE DENSITY AND STRUCTURE, UNSPECIFIED SITE: ICD-10-CM

## 2025-01-02 DIAGNOSIS — E28.319 ASYMPTOMATIC PREMATURE MENOPAUSE: ICD-10-CM

## 2025-01-02 PROCEDURE — 77080 DXA BONE DENSITY AXIAL: CPT

## 2025-01-02 PROCEDURE — 77063 BREAST TOMOSYNTHESIS BI: CPT

## 2025-01-03 ENCOUNTER — HOSPITAL ENCOUNTER (EMERGENCY)
Age: 75
Discharge: HOME | End: 2025-01-03
Attending: STUDENT IN AN ORGANIZED HEALTH CARE EDUCATION/TRAINING PROGRAM
Payer: MEDICARE

## 2025-01-03 ENCOUNTER — APPOINTMENT (OUTPATIENT)
Dept: CT IMAGING | Age: 75
End: 2025-01-03
Payer: MEDICARE

## 2025-01-03 ENCOUNTER — APPOINTMENT (OUTPATIENT)
Dept: GENERAL RADIOLOGY | Age: 75
End: 2025-01-03
Payer: MEDICARE

## 2025-01-03 VITALS
OXYGEN SATURATION: 96 % | BODY MASS INDEX: 33.66 KG/M2 | DIASTOLIC BLOOD PRESSURE: 72 MMHG | HEART RATE: 78 BPM | SYSTOLIC BLOOD PRESSURE: 132 MMHG | RESPIRATION RATE: 18 BRPM | TEMPERATURE: 97.8 F | HEIGHT: 63 IN | WEIGHT: 190 LBS

## 2025-01-03 DIAGNOSIS — J06.9 UPPER RESPIRATORY TRACT INFECTION, UNSPECIFIED TYPE: Primary | ICD-10-CM

## 2025-01-03 LAB
ANION GAP SERPL CALCULATED.3IONS-SCNC: 10 MMOL/L (ref 9–17)
BASOPHILS # BLD: 0.1 K/UL (ref 0–0.2)
BASOPHILS NFR BLD: 1 % (ref 0–2)
BUN SERPL-MCNC: 14 MG/DL (ref 8–23)
CALCIUM SERPL-MCNC: 9.3 MG/DL (ref 8.6–10.4)
CHLORIDE SERPL-SCNC: 103 MMOL/L (ref 98–107)
CO2 SERPL-SCNC: 27 MMOL/L (ref 20–31)
CREAT SERPL-MCNC: 0.9 MG/DL (ref 0.5–0.9)
EOSINOPHIL # BLD: 0.1 K/UL (ref 0–0.4)
EOSINOPHILS RELATIVE PERCENT: 1 % (ref 1–4)
ERYTHROCYTE [DISTWIDTH] IN BLOOD BY AUTOMATED COUNT: 13 % (ref 12.5–15.4)
FLUAV AG SPEC QL: NEGATIVE
FLUBV AG SPEC QL: NEGATIVE
GFR, ESTIMATED: 68 ML/MIN/1.73M2
GLUCOSE SERPL-MCNC: 97 MG/DL (ref 70–99)
HCT VFR BLD AUTO: 37.2 % (ref 36–46)
HGB BLD-MCNC: 12.7 G/DL (ref 12–16)
LYMPHOCYTES NFR BLD: 1.2 K/UL (ref 1–4.8)
LYMPHOCYTES RELATIVE PERCENT: 21 % (ref 24–44)
MCH RBC QN AUTO: 32 PG (ref 26–34)
MCHC RBC AUTO-ENTMCNC: 34.2 G/DL (ref 31–37)
MCV RBC AUTO: 93.6 FL (ref 80–100)
MONOCYTES NFR BLD: 0.4 K/UL (ref 0.1–1.2)
MONOCYTES NFR BLD: 7 % (ref 2–11)
NEUTROPHILS NFR BLD: 70 % (ref 36–66)
NEUTS SEG NFR BLD: 3.9 K/UL (ref 1.8–7.7)
PLATELET # BLD AUTO: 206 K/UL (ref 140–450)
PMV BLD AUTO: 8.2 FL (ref 6–12)
POTASSIUM SERPL-SCNC: 4.2 MMOL/L (ref 3.7–5.3)
RBC # BLD AUTO: 3.97 M/UL (ref 4–5.2)
SARS-COV-2 RDRP RESP QL NAA+PROBE: NOT DETECTED
SODIUM SERPL-SCNC: 140 MMOL/L (ref 135–144)
SPECIMEN DESCRIPTION: NORMAL
SPECIMEN SOURCE: NORMAL
STREP A, MOLECULAR: NEGATIVE
TROPONIN I SERPL HS-MCNC: <6 NG/L (ref 0–14)
WBC OTHER # BLD: 5.6 K/UL (ref 3.5–11)

## 2025-01-03 PROCEDURE — 87804 INFLUENZA ASSAY W/OPTIC: CPT

## 2025-01-03 PROCEDURE — 71046 X-RAY EXAM CHEST 2 VIEWS: CPT

## 2025-01-03 PROCEDURE — 99285 EMERGENCY DEPT VISIT HI MDM: CPT

## 2025-01-03 PROCEDURE — 85025 COMPLETE CBC W/AUTO DIFF WBC: CPT

## 2025-01-03 PROCEDURE — 84484 ASSAY OF TROPONIN QUANT: CPT

## 2025-01-03 PROCEDURE — 36415 COLL VENOUS BLD VENIPUNCTURE: CPT

## 2025-01-03 PROCEDURE — 80048 BASIC METABOLIC PNL TOTAL CA: CPT

## 2025-01-03 PROCEDURE — 70450 CT HEAD/BRAIN W/O DYE: CPT

## 2025-01-03 PROCEDURE — 93005 ELECTROCARDIOGRAM TRACING: CPT | Performed by: NURSE PRACTITIONER

## 2025-01-03 PROCEDURE — 87651 STREP A DNA AMP PROBE: CPT

## 2025-01-03 PROCEDURE — 87635 SARS-COV-2 COVID-19 AMP PRB: CPT

## 2025-01-03 RX ORDER — ROSUVASTATIN CALCIUM 20 MG/1
20 TABLET, COATED ORAL DAILY
COMMUNITY

## 2025-01-03 RX ORDER — CETIRIZINE HYDROCHLORIDE 10 MG/1
10 TABLET ORAL DAILY
COMMUNITY

## 2025-01-03 ASSESSMENT — ENCOUNTER SYMPTOMS
NAUSEA: 0
ABDOMINAL PAIN: 0
DIARRHEA: 0
SINUS PAIN: 1
VOMITING: 0
BACK PAIN: 1
SHORTNESS OF BREATH: 0
COUGH: 1
SORE THROAT: 1

## 2025-01-03 ASSESSMENT — PAIN - FUNCTIONAL ASSESSMENT: PAIN_FUNCTIONAL_ASSESSMENT: 0-10

## 2025-01-03 ASSESSMENT — PAIN SCALES - GENERAL: PAINLEVEL_OUTOF10: 6

## 2025-01-03 NOTE — ED PROVIDER NOTES
[facility] EMERGENCY DEPARTMENT  EMERGENCY DEPARTMENT ENCOUNTER      Pt Name: Jovana Freitas  MRN: [id number]  Birthdate 1950  Date of evaluation: 1/3/2025  Provider: CHICHI Garcia CNP  7:57 PM    CHIEF COMPLAINT       Chief Complaint   Patient presents with    Sinusitis    Nasal Congestion    Pharyngitis     Patient c/o sore throat that started today, nasal congestion, cough that started on Saturday, and back pain that started this morning. Patient states she has some sinus pressure for the past week.     Back Pain         HISTORY OF PRESENT ILLNESS    Jovana Freitas is a 74 y.o. female who presents to the emergency department      This is a nontoxic-appearing 74-year-old female presenting to the emergency department reports that she started with nasal congestion postnasal drip throat irritation on Saturday, yesterday the symptoms became worse after she had her mammogram and DEXA scan, she also has had an ongoing headache for the last 2 weeks states that she did have a head injury in November 2024, she is anticoagulated with Eliquis; she followed with her family care provider, who instructed her to monitor for headaches, the patient's had no other recent head traumas, she states that she had an episode today while she was walking up her steps of chest pain described as sharp stabbing that lasted momentarily, has noticed some increased chest pain with deep breathing; she is anticoagulated with Eliquis with a history of blood clots; the patient denies that she has missed any doses of the Eliquis.  The patient follows with [facility] cardiology was most recently in the office 2 months ago; the patient was concerned due to all symptoms prompting her to come to the emergency department for evaluation.  She reports that she has other family members ill with similar symptoms however they have not been around these family members.  Patient has tried using over-the-counter Robitussin to help with 
changes, nonspecific EKG    EMERGENCY DEPARTMENT COURSE:   Vitals:    Vitals:    01/03/25 1648   BP: 132/72   Pulse: 78   Resp: 18   Temp: 97.8 °F (36.6 °C)   TempSrc: Oral   SpO2: 96%   Weight: 86.2 kg (190 lb)   Height: 1.6 m (5' 3\")     -------------------------  BP: 132/72, Temp: 97.8 °F (36.6 °C), Pulse: 78, Respirations: 18      Medical Decision Making  Patient is a 74-year-old female who presents with multiple complaints.  Has been having a cough and congestion for a few weeks.  States today she was coughing and fell like she had a pain in her back, states she had something similar a few years ago when she had costochondritis, wondering if she has the same again.  Patient also states she has been having intermittent headaches.  States that she did have a fall back in November where she did strike her head, does take Eliquis, was not evaluated after that fall.    Patient appears in no acute distress on initial evaluation, afebrile, stable vital signs.  Heart regular rate and rhythm, lungs clear.  Will plan on basic workup here including labs, EKG, chest x-ray.  Will plan on CT head due to the fall on Eliquis with new headaches.  Will monitor closely and reassess.  If workup here unremarkable anticipate discharge.    Workup here grossly unremarkable.  Will discharge with close outpatient follow-up.               Critical Care:  None      FINAL IMPRESSION      1. Upper respiratory tract infection, unspecified type          DISPOSITION/PLAN     DISPOSITION Decision To Discharge 01/03/2025 07:45:49 PM   DISPOSITION CONDITION Stable           PATIENT REFERRED TO:  Kirsten Ahumada,   [address] [address] [phone]    Schedule an appointment as soon as possible for a visit         DISCHARGE MEDICATIONS:  Discharge Medication List as of 1/3/2025  7:59 PM             (Please note that portions of this note were completed with a voice recognition program.  Efforts were

## 2025-01-04 LAB
EKG ATRIAL RATE: 72 BPM
EKG P AXIS: 31 DEGREES
EKG P-R INTERVAL: 158 MS
EKG Q-T INTERVAL: 398 MS
EKG QRS DURATION: 78 MS
EKG QTC CALCULATION (BAZETT): 435 MS
EKG R AXIS: 6 DEGREES
EKG T AXIS: 35 DEGREES
EKG VENTRICULAR RATE: 72 BPM

## 2025-01-04 PROCEDURE — 93010 ELECTROCARDIOGRAM REPORT: CPT | Performed by: INTERNAL MEDICINE

## 2025-01-04 NOTE — DISCHARGE INSTRUCTIONS
Tylenol over-the-counter to help with pain or fevers.    Stay well rested well-hydrated.    Return to the ER: Fevers not responding to Tylenol, continued worsening chest pain or breathing difficulty, weakness or confusion, inability to swallow or drooling; or any other concerning symptoms

## 2025-05-31 ENCOUNTER — HOSPITAL ENCOUNTER (EMERGENCY)
Age: 75
Discharge: HOME | End: 2025-05-31
Attending: EMERGENCY MEDICINE
Payer: MEDICARE

## 2025-05-31 VITALS
SYSTOLIC BLOOD PRESSURE: 147 MMHG | DIASTOLIC BLOOD PRESSURE: 74 MMHG | RESPIRATION RATE: 20 BRPM | BODY MASS INDEX: 33.84 KG/M2 | TEMPERATURE: 97.7 F | WEIGHT: 191 LBS | HEIGHT: 63 IN | HEART RATE: 67 BPM | OXYGEN SATURATION: 95 %

## 2025-05-31 DIAGNOSIS — L73.1 INGROWN HAIR: Primary | ICD-10-CM

## 2025-05-31 PROCEDURE — 6370000000 HC RX 637 (ALT 250 FOR IP)

## 2025-05-31 PROCEDURE — 99283 EMERGENCY DEPT VISIT LOW MDM: CPT

## 2025-05-31 RX ORDER — CEPHALEXIN 500 MG/1
500 CAPSULE ORAL 2 TIMES DAILY
Qty: 14 CAPSULE | Refills: 0 | Status: SHIPPED | OUTPATIENT
Start: 2025-05-31 | End: 2025-06-07

## 2025-05-31 RX ORDER — TRAMADOL HYDROCHLORIDE 50 MG/1
50 TABLET ORAL ONCE
Status: COMPLETED | OUTPATIENT
Start: 2025-05-31 | End: 2025-05-31

## 2025-05-31 RX ADMIN — CEPHALEXIN 500 MG: 250 CAPSULE ORAL at 15:18

## 2025-05-31 RX ADMIN — TRAMADOL HYDROCHLORIDE 50 MG: 50 TABLET, COATED ORAL at 15:23

## 2025-05-31 ASSESSMENT — PAIN - FUNCTIONAL ASSESSMENT: PAIN_FUNCTIONAL_ASSESSMENT: 0-10

## 2025-05-31 ASSESSMENT — PAIN DESCRIPTION - DESCRIPTORS: DESCRIPTORS: DISCOMFORT

## 2025-05-31 ASSESSMENT — ENCOUNTER SYMPTOMS
ABDOMINAL PAIN: 0
NAUSEA: 0
VOMITING: 0
SHORTNESS OF BREATH: 0

## 2025-05-31 ASSESSMENT — PAIN SCALES - GENERAL
PAINLEVEL_OUTOF10: 5
PAINLEVEL_OUTOF10: 5

## 2025-05-31 ASSESSMENT — PAIN DESCRIPTION - LOCATION: LOCATION: VAGINA

## 2025-05-31 ASSESSMENT — LIFESTYLE VARIABLES
HOW OFTEN DO YOU HAVE A DRINK CONTAINING ALCOHOL: MONTHLY OR LESS
HOW MANY STANDARD DRINKS CONTAINING ALCOHOL DO YOU HAVE ON A TYPICAL DAY: 1 OR 2

## 2025-05-31 ASSESSMENT — PAIN DESCRIPTION - ORIENTATION: ORIENTATION: LEFT

## 2025-05-31 NOTE — ED PROVIDER NOTES
[facility] Emergency Department      Pt Name: Jovana Freitas  MRN: [id number]  Birthdate 1950  Date of evaluation: 5/31/2025    EMERGENCY DEPARTMENT ENCOUNTER           I reviewed the mid level provider's note and agree with the documented findings and we have discussed the plan of care. I have reviewed the emergency nurses triage note. I agree with the chief complaint, past medical history, past surgical history, allergies, medications, social and family history as documented unless otherwise noted below.      Talib Roth,   05/31/25 1502

## 2025-05-31 NOTE — ED PROVIDER NOTES
[facility] Emergency Department  [address]  [facility] [address]  Phone: [phone]  Fax: [phone]        Pt Name: Jovana Freitas  MRN: [id number]  Birthdate 1950  Date of evaluation: 25    CHIEFCOMPLAINT       Chief Complaint   Patient presents with    Cyst     Patient presents to ED with complaints of possible vaginal cyst. Patient states it is the left side of her labia. Patient states she first noticed this several months ago. Patient states it comes and goes. Patient denies any drainage or bleeding.       HISTORY OF PRESENT ILLNESS (Location/Symptom, Timing/Onset, Context/Setting, Quality, Duration, Modifying Factors, Severity)      Jovana Freitas is a 74 y.o. female with pertinent PMH of lichens planus, arthritis, hypertension who presents to the ED via private auto with complaint of painful area to her left external labia that she noticed couple of days ago.  Today she states with sitting she can feel the discomfort and has a shooting pain with walking.  No bleeding or drainage from the area.  Unsure if this feels similar to when she was diagnosed with lichens planus and given topical vaginal cream for this.  No fever or chills, nausea or vomiting, chest pain or shortness of breath.  No vaginal discharge or odor.  No urinary complaints.  Tylenol relieves pain.    PAST MEDICAL / SURGICAL / SOCIAL / FAMILY HISTORY     PMH:  has a past medical history of Arthritis, Colorectal cancer (HCC), Hyperlipidemia, Hypertension, and Neuropathy.  Surgical History:  has a past surgical history that includes knee surgery; Colon surgery;  section; Breast biopsy; and Hysterectomy.  Social History:  reports that she has never smoked. She has never used smokeless tobacco. She reports that she does not drink alcohol and does not use drugs.  Family History: She indicated that the status of her mother is unknown. She indicated that the status of her father is unknown.   family

## 2025-06-23 RX ORDER — FENOFIBRATE 48 MG/1
48 TABLET, FILM COATED ORAL DAILY
COMMUNITY

## 2025-06-23 RX ORDER — VIT C/B6/B5/MAGNESIUM/HERB 173 50-5-6-5MG
CAPSULE ORAL DAILY
COMMUNITY

## 2025-06-23 RX ORDER — CEPHALEXIN 500 MG/1
500 CAPSULE ORAL 4 TIMES DAILY
COMMUNITY
End: 2025-06-25 | Stop reason: ALTCHOICE

## 2025-06-23 RX ORDER — METRONIDAZOLE TOPICAL 7.5 MG/G
GEL TOPICAL
COMMUNITY

## 2025-06-23 RX ORDER — AZELASTINE 1 MG/ML
1 SPRAY, METERED NASAL 2 TIMES DAILY
COMMUNITY

## 2025-06-23 RX ORDER — MULTIVIT-MIN/IRON/FOLIC ACID/K 18-600-40
2000 CAPSULE ORAL DAILY
COMMUNITY

## 2025-06-23 RX ORDER — MAGNESIUM OXIDE 400 MG/1
TABLET ORAL
COMMUNITY

## 2025-06-25 ENCOUNTER — OFFICE VISIT (OUTPATIENT)
Age: 75
End: 2025-06-25
Payer: MEDICARE

## 2025-06-25 VITALS
HEART RATE: 62 BPM | TEMPERATURE: 97.8 F | SYSTOLIC BLOOD PRESSURE: 133 MMHG | BODY MASS INDEX: 34.61 KG/M2 | DIASTOLIC BLOOD PRESSURE: 70 MMHG | WEIGHT: 195.4 LBS | OXYGEN SATURATION: 94 %

## 2025-06-25 DIAGNOSIS — R33.9 URINE RETENTION: Primary | ICD-10-CM

## 2025-06-25 DIAGNOSIS — R10.2 VAGINAL PAIN: ICD-10-CM

## 2025-06-25 DIAGNOSIS — Z86.718 HX OF BLOOD CLOTS: ICD-10-CM

## 2025-06-25 DIAGNOSIS — Z85.038 HX OF COLON CANCER, STAGE I: ICD-10-CM

## 2025-06-25 DIAGNOSIS — N95.2 ATROPHIC VULVOVAGINITIS: ICD-10-CM

## 2025-06-25 DIAGNOSIS — Z87.42 HX OF ABNORMAL CERVICAL PAP SMEAR: ICD-10-CM

## 2025-06-25 DIAGNOSIS — Z01.419 GYNECOLOGIC EXAM NORMAL: ICD-10-CM

## 2025-06-25 LAB
BILIRUBIN, POC: ABNORMAL
BLOOD URINE, POC: 50
CLARITY, POC: CLEAR
COLOR, POC: YELLOW
GLUCOSE URINE, POC: ABNORMAL MG/DL
KETONES, POC: ABNORMAL MG/DL
LEUKOCYTE EST, POC: ABNORMAL
NITRITE, POC: ABNORMAL
PH, POC: 6
PROTEIN, POC: ABNORMAL MG/DL
SPECIFIC GRAVITY, POC: 1.02
UROBILINOGEN, POC: ABNORMAL MG/DL

## 2025-06-25 PROCEDURE — 3078F DIAST BP <80 MM HG: CPT | Performed by: NURSE PRACTITIONER

## 2025-06-25 PROCEDURE — 1159F MED LIST DOCD IN RCRD: CPT | Performed by: NURSE PRACTITIONER

## 2025-06-25 PROCEDURE — 99204 OFFICE O/P NEW MOD 45 MIN: CPT | Performed by: NURSE PRACTITIONER

## 2025-06-25 PROCEDURE — 81003 URINALYSIS AUTO W/O SCOPE: CPT | Performed by: NURSE PRACTITIONER

## 2025-06-25 PROCEDURE — 1160F RVW MEDS BY RX/DR IN RCRD: CPT | Performed by: NURSE PRACTITIONER

## 2025-06-25 PROCEDURE — 51798 US URINE CAPACITY MEASURE: CPT | Performed by: NURSE PRACTITIONER

## 2025-06-25 PROCEDURE — 1123F ACP DISCUSS/DSCN MKR DOCD: CPT | Performed by: NURSE PRACTITIONER

## 2025-06-25 PROCEDURE — 3075F SYST BP GE 130 - 139MM HG: CPT | Performed by: NURSE PRACTITIONER

## 2025-06-25 RX ORDER — ESTRADIOL 0.1 MG/G
1 CREAM VAGINAL
Qty: 42 G | Refills: 3 | Status: SHIPPED | OUTPATIENT
Start: 2025-06-26

## 2025-06-25 ASSESSMENT — ENCOUNTER SYMPTOMS
CONSTIPATION: 0
BACK PAIN: 1
DIARRHEA: 0

## 2025-06-25 NOTE — PROGRESS NOTES
vaginal cream; Place 1 g vaginally Twice a Week, Disp-42 g, R-3Normal  5. Vaginal pain  Patient to follow up with Dr. Sil Guillermo for intermittent vaginal pain due to hx of colorectal cancer. No gyn source of pain.    Orders Placed This Encounter   Medications    estradiol (ESTRACE VAGINAL) 0.1 MG/GM vaginal cream     Sig: Place 1 g vaginally Twice a Week     Dispense:  42 g     Refill:  3         Diagnosis Orders   1. Urine retention  POCT Urinalysis No Micro (Auto)    LAKHWINDER,POST-VOID RES,US,NON-IMAGING      2. Hx of blood clots        3. Hx of colon cancer, stage I        4. Atrophic vulvovaginitis  estradiol (ESTRACE VAGINAL) 0.1 MG/GM vaginal cream      5. Vaginal pain                  Assessment & Plan         1. Detailed questionnaires regarding the patient's specific condition were given to the patient as indicated. The patient understands that these are her responsibility to complete and return on a voluntary basis. Reminders to complete the questionnaires will not be given. The patient understands that failure to return the questionnaires may not give the provider a full picture of the patient's urogynecologic issues and make treatment less than optimal despite the practitioner's best effort to obtain information.     2. Educational handouts were discussed & given when applicable .     4.Testing recommendations were given as indicated.    The patient (or guardian, if applicable) and other individuals in attendance with the patient were advised that Artificial Intelligence will be utilized during this visit to record, process the conversation to generate a clinical note, and support improvement of the AI technology. The patient (or guardian, if applicable) and other individuals in attendance at the appointment consented to the use of AI, including the recording.         Multiple records reviewed. All questions were addressed to the patient's satisfaction.      Follow up: Return in about 1 year (around

## 2025-06-26 ENCOUNTER — HOSPITAL ENCOUNTER (OUTPATIENT)
Age: 75
Setting detail: SPECIMEN
Discharge: HOME | End: 2025-06-26

## 2025-07-14 LAB — CYTOLOGY REPORT: NORMAL

## 2025-08-22 ENCOUNTER — HOSPITAL ENCOUNTER (EMERGENCY)
Age: 75
Discharge: HOME | End: 2025-08-22
Attending: STUDENT IN AN ORGANIZED HEALTH CARE EDUCATION/TRAINING PROGRAM
Payer: MEDICARE

## 2025-08-22 ENCOUNTER — APPOINTMENT (OUTPATIENT)
Dept: GENERAL RADIOLOGY | Age: 75
End: 2025-08-22
Payer: MEDICARE

## 2025-08-22 ENCOUNTER — APPOINTMENT (OUTPATIENT)
Dept: CT IMAGING | Age: 75
End: 2025-08-22
Payer: MEDICARE

## 2025-08-22 VITALS
DIASTOLIC BLOOD PRESSURE: 85 MMHG | WEIGHT: 190 LBS | HEART RATE: 59 BPM | RESPIRATION RATE: 16 BRPM | BODY MASS INDEX: 34.96 KG/M2 | HEIGHT: 62 IN | OXYGEN SATURATION: 99 % | TEMPERATURE: 97.3 F | SYSTOLIC BLOOD PRESSURE: 165 MMHG

## 2025-08-22 DIAGNOSIS — R03.0 ELEVATED BLOOD PRESSURE READING: ICD-10-CM

## 2025-08-22 DIAGNOSIS — R53.1 GENERAL WEAKNESS: Primary | ICD-10-CM

## 2025-08-22 LAB
ANION GAP SERPL CALCULATED.3IONS-SCNC: 11 MMOL/L (ref 9–16)
BASOPHILS # BLD: 0.1 K/UL (ref 0–0.2)
BASOPHILS NFR BLD: 1 % (ref 0–2)
BUN SERPL-MCNC: 16 MG/DL (ref 8–23)
CALCIUM SERPL-MCNC: 9.6 MG/DL (ref 8.6–10.4)
CHLORIDE SERPL-SCNC: 107 MMOL/L (ref 98–107)
CO2 SERPL-SCNC: 24 MMOL/L (ref 20–31)
CREAT SERPL-MCNC: 0.9 MG/DL (ref 0.6–0.9)
EKG ATRIAL RATE: 53 BPM
EKG P AXIS: 35 DEGREES
EKG P-R INTERVAL: 154 MS
EKG Q-T INTERVAL: 446 MS
EKG QRS DURATION: 80 MS
EKG QTC CALCULATION (BAZETT): 418 MS
EKG R AXIS: 6 DEGREES
EKG T AXIS: 31 DEGREES
EKG VENTRICULAR RATE: 53 BPM
EOSINOPHIL # BLD: 0.1 K/UL (ref 0–0.4)
EOSINOPHILS RELATIVE PERCENT: 2 % (ref 1–4)
ERYTHROCYTE [DISTWIDTH] IN BLOOD BY AUTOMATED COUNT: 13.5 % (ref 12.5–15.4)
GFR, ESTIMATED: 68 ML/MIN/1.73M2
GLUCOSE SERPL-MCNC: 98 MG/DL (ref 74–99)
HCT VFR BLD AUTO: 38.2 % (ref 36–46)
HGB BLD-MCNC: 12.7 G/DL (ref 12–16)
LYMPHOCYTES NFR BLD: 2.2 K/UL (ref 1–4.8)
LYMPHOCYTES RELATIVE PERCENT: 35 % (ref 24–44)
MCH RBC QN AUTO: 31.5 PG (ref 26–34)
MCHC RBC AUTO-ENTMCNC: 33.3 G/DL (ref 31–37)
MCV RBC AUTO: 94.6 FL (ref 80–100)
MONOCYTES NFR BLD: 0.4 K/UL (ref 0.1–1.2)
MONOCYTES NFR BLD: 6 % (ref 2–11)
NEUTROPHILS NFR BLD: 56 % (ref 36–66)
NEUTS SEG NFR BLD: 3.6 K/UL (ref 1.8–7.7)
PLATELET # BLD AUTO: 254 K/UL (ref 140–450)
PMV BLD AUTO: 9.2 FL (ref 6–12)
POTASSIUM SERPL-SCNC: 4.2 MMOL/L (ref 3.7–5.3)
RBC # BLD AUTO: 4.04 M/UL (ref 4–5.2)
SODIUM SERPL-SCNC: 142 MMOL/L (ref 136–145)
TROPONIN I SERPL HS-MCNC: <6 NG/L (ref 0–14)
WBC OTHER # BLD: 6.3 K/UL (ref 3.5–11)

## 2025-08-22 PROCEDURE — 36415 COLL VENOUS BLD VENIPUNCTURE: CPT

## 2025-08-22 PROCEDURE — 84484 ASSAY OF TROPONIN QUANT: CPT

## 2025-08-22 PROCEDURE — 70450 CT HEAD/BRAIN W/O DYE: CPT

## 2025-08-22 PROCEDURE — 99285 EMERGENCY DEPT VISIT HI MDM: CPT

## 2025-08-22 PROCEDURE — 71045 X-RAY EXAM CHEST 1 VIEW: CPT

## 2025-08-22 PROCEDURE — 80048 BASIC METABOLIC PNL TOTAL CA: CPT

## 2025-08-22 PROCEDURE — 85025 COMPLETE CBC W/AUTO DIFF WBC: CPT

## 2025-08-22 PROCEDURE — 93005 ELECTROCARDIOGRAM TRACING: CPT | Performed by: NURSE PRACTITIONER

## 2025-08-22 ASSESSMENT — ENCOUNTER SYMPTOMS
DIARRHEA: 0
SINUS PAIN: 0
SHORTNESS OF BREATH: 0
NAUSEA: 0
VOMITING: 0
ABDOMINAL PAIN: 0
COUGH: 0
SORE THROAT: 0

## 2025-08-22 ASSESSMENT — PAIN - FUNCTIONAL ASSESSMENT: PAIN_FUNCTIONAL_ASSESSMENT: 0-10

## 2025-08-22 ASSESSMENT — PAIN SCALES - GENERAL: PAINLEVEL_OUTOF10: 0

## 2025-08-25 LAB
EKG ATRIAL RATE: 53 BPM
EKG P AXIS: 35 DEGREES
EKG P-R INTERVAL: 154 MS
EKG Q-T INTERVAL: 446 MS
EKG QRS DURATION: 80 MS
EKG QTC CALCULATION (BAZETT): 418 MS
EKG R AXIS: 6 DEGREES
EKG T AXIS: 31 DEGREES
EKG VENTRICULAR RATE: 53 BPM

## 2025-08-25 PROCEDURE — 93010 ELECTROCARDIOGRAM REPORT: CPT | Performed by: INTERNAL MEDICINE
